# Patient Record
Sex: FEMALE | Race: WHITE | NOT HISPANIC OR LATINO | Employment: OTHER | URBAN - NONMETROPOLITAN AREA
[De-identification: names, ages, dates, MRNs, and addresses within clinical notes are randomized per-mention and may not be internally consistent; named-entity substitution may affect disease eponyms.]

---

## 2017-01-07 ENCOUNTER — HOSPITAL ENCOUNTER (OUTPATIENT)
Dept: URGENT CARE | Facility: CLINIC | Age: 82
Discharge: HOME | End: 2017-01-07
Attending: NURSE PRACTITIONER | Admitting: NURSE PRACTITIONER

## 2017-01-07 ENCOUNTER — HOSPITAL ENCOUNTER (OUTPATIENT)
Dept: OTHER | Facility: HOSPITAL | Age: 82
Discharge: HOME | End: 2017-01-07
Attending: NURSE PRACTITIONER | Admitting: NURSE PRACTITIONER

## 2017-04-19 DIAGNOSIS — R30.0 DYSURIA: Primary | ICD-10-CM

## 2017-05-30 RX ORDER — ATORVASTATIN CALCIUM 20 MG/1
TABLET, FILM COATED ORAL
Qty: 15 TABLET | Refills: 0 | Status: SHIPPED | OUTPATIENT
Start: 2017-05-30 | End: 2017-08-15 | Stop reason: SDUPTHER

## 2017-08-08 DIAGNOSIS — E78.5 HYPERLIPIDEMIA, UNSPECIFIED HYPERLIPIDEMIA TYPE: Primary | ICD-10-CM

## 2017-08-08 DIAGNOSIS — I10 ESSENTIAL HYPERTENSION: ICD-10-CM

## 2017-08-08 DIAGNOSIS — M81.0 OSTEOPOROSIS: ICD-10-CM

## 2017-08-08 DIAGNOSIS — R73.01 IMPAIRED FASTING BLOOD SUGAR: ICD-10-CM

## 2017-08-14 RX ORDER — LOSARTAN POTASSIUM AND HYDROCHLOROTHIAZIDE 12.5; 5 MG/1; MG/1
TABLET ORAL
Qty: 30 TABLET | Refills: 11 | Status: CANCELLED | OUTPATIENT
Start: 2017-08-14

## 2017-08-15 ENCOUNTER — OFFICE VISIT (OUTPATIENT)
Dept: FAMILY MEDICINE CLINIC | Facility: CLINIC | Age: 82
End: 2017-08-15

## 2017-08-15 ENCOUNTER — LAB (OUTPATIENT)
Dept: LAB | Facility: OTHER | Age: 82
End: 2017-08-15

## 2017-08-15 VITALS
DIASTOLIC BLOOD PRESSURE: 60 MMHG | BODY MASS INDEX: 27.72 KG/M2 | SYSTOLIC BLOOD PRESSURE: 118 MMHG | HEART RATE: 64 BPM | HEIGHT: 67 IN | WEIGHT: 176.6 LBS | TEMPERATURE: 97.8 F

## 2017-08-15 DIAGNOSIS — I10 ESSENTIAL HYPERTENSION: ICD-10-CM

## 2017-08-15 DIAGNOSIS — E55.9 VITAMIN D DEFICIENCY: Chronic | ICD-10-CM

## 2017-08-15 DIAGNOSIS — M81.0 OSTEOPOROSIS: ICD-10-CM

## 2017-08-15 DIAGNOSIS — F32.A DEPRESSIVE DISORDER: Chronic | ICD-10-CM

## 2017-08-15 DIAGNOSIS — R73.01 IMPAIRED FASTING BLOOD SUGAR: ICD-10-CM

## 2017-08-15 DIAGNOSIS — G30.1 LATE ONSET ALZHEIMER'S DISEASE WITHOUT BEHAVIORAL DISTURBANCE (HCC): Chronic | ICD-10-CM

## 2017-08-15 DIAGNOSIS — R73.01 IMPAIRED FASTING GLUCOSE: Chronic | ICD-10-CM

## 2017-08-15 DIAGNOSIS — K59.04 CHRONIC IDIOPATHIC CONSTIPATION: Chronic | ICD-10-CM

## 2017-08-15 DIAGNOSIS — E78.2 MIXED HYPERLIPIDEMIA: Chronic | ICD-10-CM

## 2017-08-15 DIAGNOSIS — F02.80 LATE ONSET ALZHEIMER'S DISEASE WITHOUT BEHAVIORAL DISTURBANCE (HCC): Chronic | ICD-10-CM

## 2017-08-15 DIAGNOSIS — I10 ESSENTIAL HYPERTENSION: Primary | Chronic | ICD-10-CM

## 2017-08-15 DIAGNOSIS — E78.5 HYPERLIPIDEMIA, UNSPECIFIED HYPERLIPIDEMIA TYPE: ICD-10-CM

## 2017-08-15 DIAGNOSIS — M17.0 PRIMARY OSTEOARTHRITIS OF BOTH KNEES: Chronic | ICD-10-CM

## 2017-08-15 LAB
ALBUMIN SERPL-MCNC: 4 G/DL (ref 3.2–5.5)
ALBUMIN/GLOB SERPL: 1.3 G/DL (ref 1–3)
ALP SERPL-CCNC: 78 U/L (ref 15–121)
ALT SERPL W P-5'-P-CCNC: 12 U/L (ref 10–60)
ANION GAP SERPL CALCULATED.3IONS-SCNC: 11 MMOL/L (ref 5–15)
AST SERPL-CCNC: 22 U/L (ref 10–60)
BACTERIA UR QL AUTO: ABNORMAL /HPF
BASOPHILS # BLD AUTO: 0.04 10*3/MM3 (ref 0–0.2)
BASOPHILS NFR BLD AUTO: 0.7 % (ref 0–2)
BILIRUB SERPL-MCNC: 0.9 MG/DL (ref 0.2–1)
BILIRUB UR QL STRIP: NEGATIVE
BUN BLD-MCNC: 43 MG/DL (ref 8–25)
BUN/CREAT SERPL: 30.7 (ref 7–25)
CALCIUM SPEC-SCNC: 9.9 MG/DL (ref 8.4–10.8)
CHLORIDE SERPL-SCNC: 103 MMOL/L (ref 100–112)
CHOLEST SERPL-MCNC: 252 MG/DL (ref 150–200)
CLARITY UR: CLEAR
CO2 SERPL-SCNC: 27 MMOL/L (ref 20–32)
COLOR UR: YELLOW
CREAT BLD-MCNC: 1.4 MG/DL (ref 0.4–1.3)
DEPRECATED RDW RBC AUTO: 45.7 FL (ref 36.4–46.3)
EOSINOPHIL # BLD AUTO: 0.19 10*3/MM3 (ref 0–0.7)
EOSINOPHIL NFR BLD AUTO: 3.3 % (ref 0–7)
ERYTHROCYTE [DISTWIDTH] IN BLOOD BY AUTOMATED COUNT: 14.9 % (ref 11.5–14.5)
GFR SERPL CREATININE-BSD FRML MDRD: 35 ML/MIN/1.73 (ref 39–90)
GLOBULIN UR ELPH-MCNC: 3.2 GM/DL (ref 2.5–4.6)
GLUCOSE BLD-MCNC: 119 MG/DL (ref 70–100)
GLUCOSE UR STRIP-MCNC: NEGATIVE MG/DL
HCT VFR BLD AUTO: 36.7 % (ref 35–45)
HDLC SERPL-MCNC: 59 MG/DL (ref 35–100)
HGB BLD-MCNC: 11.8 G/DL (ref 12–15.5)
HGB UR QL STRIP.AUTO: NEGATIVE
HYALINE CASTS UR QL AUTO: ABNORMAL /LPF
KETONES UR QL STRIP: ABNORMAL
LDLC SERPL CALC-MCNC: 165 MG/DL
LDLC/HDLC SERPL: 2.8 {RATIO}
LEUKOCYTE ESTERASE UR QL STRIP.AUTO: ABNORMAL
LYMPHOCYTES # BLD AUTO: 1.81 10*3/MM3 (ref 0.6–4.2)
LYMPHOCYTES NFR BLD AUTO: 31.7 % (ref 10–50)
MCH RBC QN AUTO: 27.3 PG (ref 26.5–34)
MCHC RBC AUTO-ENTMCNC: 32.2 G/DL (ref 31.4–36)
MCV RBC AUTO: 84.8 FL (ref 80–98)
MONOCYTES # BLD AUTO: 0.57 10*3/MM3 (ref 0–0.9)
MONOCYTES NFR BLD AUTO: 10 % (ref 0–12)
MUCOUS THREADS URNS QL MICRO: ABNORMAL /HPF
NEUTROPHILS # BLD AUTO: 3.1 10*3/MM3 (ref 2–8.6)
NEUTROPHILS NFR BLD AUTO: 54.3 % (ref 37–80)
NITRITE UR QL STRIP: NEGATIVE
PH UR STRIP.AUTO: 5.5 [PH] (ref 5.5–8)
PLATELET # BLD AUTO: 287 10*3/MM3 (ref 150–450)
PMV BLD AUTO: 9.9 FL (ref 8–12)
POTASSIUM BLD-SCNC: 5.1 MMOL/L (ref 3.4–5.4)
PROT SERPL-MCNC: 7.2 G/DL (ref 6.7–8.2)
PROT UR QL STRIP: NEGATIVE
RBC # BLD AUTO: 4.33 10*6/MM3 (ref 3.77–5.16)
RBC # UR: ABNORMAL /HPF
REF LAB TEST METHOD: ABNORMAL
SODIUM BLD-SCNC: 141 MMOL/L (ref 134–146)
SP GR UR STRIP: 1.02 (ref 1–1.03)
SQUAMOUS #/AREA URNS HPF: ABNORMAL /HPF
TRIGL SERPL-MCNC: 138 MG/DL (ref 35–160)
UROBILINOGEN UR QL STRIP: ABNORMAL
VLDLC SERPL-MCNC: 27.6 MG/DL
WBC CASTS #/AREA URNS LPF: ABNORMAL /LPF
WBC CLUMPS # UR AUTO: ABNORMAL /HPF
WBC NRBC COR # BLD: 5.71 10*3/MM3 (ref 3.2–9.8)
WBC UR QL AUTO: ABNORMAL /HPF
YEAST URNS QL MICRO: ABNORMAL /HPF

## 2017-08-15 PROCEDURE — 99214 OFFICE O/P EST MOD 30 MIN: CPT | Performed by: INTERNAL MEDICINE

## 2017-08-15 PROCEDURE — 85025 COMPLETE CBC W/AUTO DIFF WBC: CPT | Performed by: INTERNAL MEDICINE

## 2017-08-15 PROCEDURE — 81001 URINALYSIS AUTO W/SCOPE: CPT | Performed by: INTERNAL MEDICINE

## 2017-08-15 PROCEDURE — 83036 HEMOGLOBIN GLYCOSYLATED A1C: CPT | Performed by: INTERNAL MEDICINE

## 2017-08-15 PROCEDURE — 36415 COLL VENOUS BLD VENIPUNCTURE: CPT | Performed by: INTERNAL MEDICINE

## 2017-08-15 PROCEDURE — 80053 COMPREHEN METABOLIC PANEL: CPT | Performed by: INTERNAL MEDICINE

## 2017-08-15 PROCEDURE — 82306 VITAMIN D 25 HYDROXY: CPT | Performed by: INTERNAL MEDICINE

## 2017-08-15 PROCEDURE — 80061 LIPID PANEL: CPT | Performed by: INTERNAL MEDICINE

## 2017-08-15 PROCEDURE — 87086 URINE CULTURE/COLONY COUNT: CPT | Performed by: INTERNAL MEDICINE

## 2017-08-15 RX ORDER — ESCITALOPRAM OXALATE 10 MG/1
TABLET ORAL
Qty: 30 TABLET | Refills: 11 | Status: SHIPPED | OUTPATIENT
Start: 2017-08-15 | End: 2017-10-16 | Stop reason: SDUPTHER

## 2017-08-15 RX ORDER — DONEPEZIL HYDROCHLORIDE 5 MG/1
5 TABLET, FILM COATED ORAL NIGHTLY
Qty: 30 TABLET | Refills: 11 | Status: SHIPPED | OUTPATIENT
Start: 2017-08-15 | End: 2018-06-05 | Stop reason: SDUPTHER

## 2017-08-15 RX ORDER — ATORVASTATIN CALCIUM 20 MG/1
TABLET, FILM COATED ORAL
Qty: 15 TABLET | Refills: 11 | Status: SHIPPED | OUTPATIENT
Start: 2017-08-15 | End: 2018-06-05 | Stop reason: SDUPTHER

## 2017-08-15 RX ORDER — LOSARTAN POTASSIUM AND HYDROCHLOROTHIAZIDE 12.5; 5 MG/1; MG/1
TABLET ORAL
Qty: 30 TABLET | Refills: 11 | Status: SHIPPED | OUTPATIENT
Start: 2017-08-15 | End: 2018-06-05 | Stop reason: SDUPTHER

## 2017-08-15 RX ORDER — NAPROXEN 375 MG/1
375 TABLET ORAL 2 TIMES DAILY WITH MEALS
Qty: 60 TABLET | Refills: 1 | Status: SHIPPED | OUTPATIENT
Start: 2017-08-15 | End: 2017-10-20 | Stop reason: SDUPTHER

## 2017-08-15 NOTE — PROGRESS NOTES
Subjective     Camilla Izquierdo is a 89 y.o. female.     History of Present Illness   Marychuy is here for overdue follow-up of multiple medical issues including hyperlipidemia, peripheral vascular disease, carotid artery disease, impaired fasting glucose, osteoporosis, Alzheimer's disease, and multiple other issues.    She has been the primary caregiver for her son, Chuck, for all of his adult life.  He has Down syndrome.  He is now living in the [facility].  A sitter stays with Marychuy at night, and family members stay with her during the day.  There has been no falls.  She seems to be doing well physically, but her memory has obviously declined.  Last year, her MMSE was 28/30.  Today her MMSE is 17/30.  We prescribed Aricept previously, but the patient decided not to take it after she read the side effects.  It was inadvertently listed as a drug allergy, but her sitter/caregiver reports she never had difficulty taking it because she never started it.  I recommend starting the Aricept today.    She is taking milk of magnesia approximate 3 times weekly to address constipation.  She gets a reasonable amount of fruits and vesicles in her diet, but has not tried using a stool softener.    They report episodic arthritic pains of the bilateral knees.  This is being relieved by use of Tylenol per label direction.  Occasionally they have used prescription strength naproxen sodium to provide additional relief.  They're requesting a refill on the naproxen sodium today.    She obtained her fasting blood work this morning.  Total cholesterol is higher at 252.  Creatinine is stable at 1.4.  CBC is stable with hemoglobin 11.9.  Fasting glucose is 119.    Review of Systems   Constitutional: Negative for appetite change, chills, fatigue, fever and unexpected weight change.   HENT: Negative for congestion, ear pain, postnasal drip, sinus pressure and sore throat.    Respiratory: Negative for cough, shortness of breath and  "wheezing.    Cardiovascular: Negative for chest pain, palpitations and leg swelling.   Gastrointestinal: Positive for constipation. Negative for abdominal pain, blood in stool, diarrhea, nausea and vomiting.   Endocrine: Negative for cold intolerance, heat intolerance, polydipsia and polyuria.   Genitourinary: Negative for dysuria, frequency, hematuria and urgency.   Musculoskeletal: Positive for gait problem.   Skin: Negative for rash.   Neurological: Negative for syncope, weakness and headaches.   Psychiatric/Behavioral: Positive for confusion and decreased concentration. Negative for agitation, behavioral problems, dysphoric mood and hallucinations.       Objective     /60  Pulse 64  Temp 97.8 °F (36.6 °C) (Oral)   Ht 67\" (170.2 cm)  Wt 176 lb 9.6 oz (80.1 kg)  BMI 27.66 kg/m2    Physical Exam   Constitutional: She is oriented to person, place, and time. She appears well-developed and well-nourished. No distress.   Pleasantly demented.  Accompanied by her caregiver   HENT:   Head: Normocephalic and atraumatic.   Nose: Right sinus exhibits no maxillary sinus tenderness and no frontal sinus tenderness. Left sinus exhibits no maxillary sinus tenderness and no frontal sinus tenderness.   Mouth/Throat: Uvula is midline, oropharynx is clear and moist and mucous membranes are normal. No oral lesions. No tonsillar exudate.   Eyes: Conjunctivae and EOM are normal. Pupils are equal, round, and reactive to light.   Neck: Trachea normal. Neck supple. No JVD present. Carotid bruit is not present. No tracheal deviation present. No thyroid mass and no thyromegaly present.   Cardiovascular: Normal rate, regular rhythm and normal heart sounds.   No extrasystoles are present. PMI is not displaced.    No murmur heard.  Pulmonary/Chest: Effort normal and breath sounds normal. No accessory muscle usage. No respiratory distress. She has no decreased breath sounds. She has no wheezes. She has no rhonchi. She has no rales. "   Abdominal: Soft. Bowel sounds are normal. She exhibits no distension. There is no hepatosplenomegaly. There is no tenderness.   Musculoskeletal:   Arthritic changes of knees and hands       Vascular Status -  Her exam exhibits right foot vasculature normal. Her exam exhibits no right foot edema. Her exam exhibits left foot vasculature normal. Her exam exhibits no left foot edema.  Lymphadenopathy:     She has no cervical adenopathy.   Neurological: She is alert and oriented to person, place, and time. No cranial nerve deficit. Coordination normal.   Skin: Skin is warm, dry and intact. No rash noted. No cyanosis. Nails show no clubbing.   Psychiatric: She has a normal mood and affect. Her speech is normal and behavior is normal.   Vitals reviewed.      PHQ-9 Depression Screening 8/15/2017   Little interest or pleasure in doing things 1   Feeling down, depressed, or hopeless 1   Trouble falling or staying asleep, or sleeping too much 0   Feeling tired or having little energy 1   Poor appetite or overeating 0   Feeling bad about yourself - or that you are a failure or have let yourself or your family down 0   Trouble concentrating on things, such as reading the newspaper or watching television 1   Moving or speaking so slowly that other people could have noticed. Or the opposite - being so fidgety or restless that you have been moving around a lot more than usual 0   Thoughts that you would be better off dead, or of hurting yourself in some way 0   PHQ-9 Total Score 4   If you checked off any problems, how difficult have these problems made it for you to do your work, take care of things at home, or get along with other people? Not difficult at all       Assessment/Plan   I have removed Aricept from her allergy list, as this was an error.  We will start Aricept 5 mg daily at bedtime and plan on increasing that in a couple months.  We will want to add Namenda in the near future as well.  The goal is to use these  medications to enable the patient to remain in the home environment for as long as possible.    I urged compliance with daily Lipitor.  The worsened lipid profile please do believe she has been rather noncompliant with this.    For the arthritic knees, she needed to use Tylenol per label directions.  She may occasionally use naproxen sodium 375 mg daily on the worst days.    Continue Lexapro 10 mg daily.    Continue the calcium supplement and vitamin D supplement.    When she returns, I want to discuss repeat carotid Dopplers and bone density.    Return to clinic in 2 months to reassess the dementia and these other issues.  We will then need to schedule her next follow-up appointment in labs.  I would prefer to see her every 6 months in the future.  Diagnoses and all orders for this visit:    Essential hypertension    Primary osteoarthritis of both knees    Mixed hyperlipidemia    Vitamin D deficiency    Chronic idiopathic constipation    Depressive disorder    Late onset Alzheimer's disease without behavioral disturbance - restarted Aricept, 8/2017    Other orders  -     atorvastatin (LIPITOR) 20 MG tablet; 1/2 tab daily  -     escitalopram (LEXAPRO) 10 MG tablet; Take one tab daily  -     losartan-hydrochlorothiazide (HYZAAR) 50-12.5 MG per tablet; Take one tab daily  -     naproxen (NAPROSYN) 375 MG tablet; Take 1 tablet by mouth 2 (Two) Times a Day With Meals.  -     donepezil (ARICEPT) 5 MG tablet; Take 1 tablet by mouth Every Night. For memory        No visits with results within 3 Week(s) from this visit.  Latest known visit with results is:    Orders Only on 04/19/2017   Component Date Value Ref Range Status   • Color,  08/15/2017 Yellow  Yellow, Straw Final   • Appearance,  08/15/2017 Clear  Clear Final   • pH,  08/15/2017 5.5  5.5 - 8.0 Final   • Specific Gravity,  08/15/2017 1.020  1.005 - 1.030 Final   • Glucose,  08/15/2017 Negative  Negative Final   • Ketones,  08/15/2017 Trace* Negative  Final   • Bilirubin, UA 08/15/2017 Negative  Negative Final   • Blood, UA 08/15/2017 Negative  Negative Final   • Protein, UA 08/15/2017 Negative  Negative Final   • Leuk Esterase, UA 08/15/2017 Moderate (2+)* Negative Final   • Nitrite, UA 08/15/2017 Negative  Negative Final   • Urobilinogen, UA 08/15/2017 0.2 E.U./dL  0.2 - 1.0 E.U./dL Final   • RBC, UA 08/15/2017 3-5* None Seen /HPF Final   • WBC, UA 08/15/2017 6-12* None Seen /HPF Final   • Bacteria, UA 08/15/2017 1+* None Seen /HPF Final   • Squamous Epithelial Cells, UA 08/15/2017 7-12* None Seen, 0-2 /HPF Final   • Yeast, UA 08/15/2017 Small/1+ Yeast  None Seen /HPF Final   • Hyaline Casts, UA 08/15/2017 None Seen  None Seen /LPF Final   • WBC Casts 08/15/2017 0-2  None Seen /LPF Final   • Mucus, UA 08/15/2017 Trace  None Seen, Trace /HPF Final   • WBC Clumps, UA 08/15/2017 Small/1+  None Seen /HPF Final   • Methodology 08/15/2017 Manual Light Microscopy   Final   ]

## 2017-08-16 ENCOUNTER — TELEPHONE (OUTPATIENT)
Dept: FAMILY MEDICINE CLINIC | Facility: CLINIC | Age: 82
End: 2017-08-16

## 2017-08-16 LAB
25(OH)D3 SERPL-MCNC: 33.3 NG/ML (ref 30–100)
HBA1C MFR BLD: 6.7 % (ref 4–5.6)

## 2017-08-16 RX ORDER — CEFUROXIME AXETIL 250 MG/1
250 TABLET ORAL 2 TIMES DAILY
Qty: 10 TABLET | Refills: 0 | Status: SHIPPED | OUTPATIENT
Start: 2017-08-16 | End: 2017-08-21

## 2017-08-16 NOTE — TELEPHONE ENCOUNTER
----- Message from Eric Barton MD sent at 8/15/2017  4:44 PM CDT -----  Ceftin 250 is  mg twice a day ×5 days.      08/16/2017 relayed results to patient and will be notifying family. Sent order to the pharmacy and will ask for delivery to her home. TP

## 2017-08-17 LAB — BACTERIA SPEC AEROBE CULT: NORMAL

## 2017-10-16 RX ORDER — ESCITALOPRAM OXALATE 10 MG/1
TABLET ORAL
Qty: 30 TABLET | Refills: 11 | Status: SHIPPED | OUTPATIENT
Start: 2017-10-16 | End: 2018-06-05 | Stop reason: SDUPTHER

## 2017-10-20 RX ORDER — NAPROXEN 375 MG/1
TABLET ORAL
Qty: 60 TABLET | Refills: 1 | Status: SHIPPED | OUTPATIENT
Start: 2017-10-20 | End: 2018-10-16

## 2018-06-05 ENCOUNTER — OFFICE VISIT (OUTPATIENT)
Dept: FAMILY MEDICINE CLINIC | Facility: CLINIC | Age: 83
End: 2018-06-05

## 2018-06-05 VITALS
TEMPERATURE: 97.2 F | DIASTOLIC BLOOD PRESSURE: 58 MMHG | HEIGHT: 67 IN | HEART RATE: 63 BPM | SYSTOLIC BLOOD PRESSURE: 122 MMHG | BODY MASS INDEX: 27.62 KG/M2 | WEIGHT: 176 LBS | OXYGEN SATURATION: 98 %

## 2018-06-05 DIAGNOSIS — F02.818 LATE ONSET ALZHEIMER'S DISEASE WITH BEHAVIORAL DISTURBANCE (HCC): Primary | Chronic | ICD-10-CM

## 2018-06-05 DIAGNOSIS — I10 ESSENTIAL HYPERTENSION: Chronic | ICD-10-CM

## 2018-06-05 DIAGNOSIS — F32.A DEPRESSIVE DISORDER: Chronic | ICD-10-CM

## 2018-06-05 DIAGNOSIS — F05 SUNDOWN SYNDROME: Chronic | ICD-10-CM

## 2018-06-05 DIAGNOSIS — G47.9 SLEEP DISORDER: Chronic | ICD-10-CM

## 2018-06-05 DIAGNOSIS — L82.1 SEBORRHEIC KERATOSIS: ICD-10-CM

## 2018-06-05 DIAGNOSIS — G30.1 LATE ONSET ALZHEIMER'S DISEASE WITH BEHAVIORAL DISTURBANCE (HCC): Primary | Chronic | ICD-10-CM

## 2018-06-05 PROCEDURE — 99214 OFFICE O/P EST MOD 30 MIN: CPT | Performed by: INTERNAL MEDICINE

## 2018-06-05 RX ORDER — QUETIAPINE FUMARATE 25 MG/1
TABLET, FILM COATED ORAL
Qty: 90 TABLET | Refills: 11 | Status: SHIPPED | OUTPATIENT
Start: 2018-06-05 | End: 2018-08-29 | Stop reason: SDUPTHER

## 2018-06-05 RX ORDER — LOSARTAN POTASSIUM AND HYDROCHLOROTHIAZIDE 12.5; 5 MG/1; MG/1
TABLET ORAL
Qty: 30 TABLET | Refills: 11 | Status: SHIPPED | OUTPATIENT
Start: 2018-06-05 | End: 2019-04-22

## 2018-06-05 RX ORDER — ESCITALOPRAM OXALATE 10 MG/1
TABLET ORAL
Qty: 30 TABLET | Refills: 11 | Status: SHIPPED | OUTPATIENT
Start: 2018-06-05 | End: 2019-06-17 | Stop reason: SDUPTHER

## 2018-06-05 RX ORDER — ATORVASTATIN CALCIUM 20 MG/1
TABLET, FILM COATED ORAL
Qty: 15 TABLET | Refills: 11 | Status: SHIPPED | OUTPATIENT
Start: 2018-06-05 | End: 2019-06-28 | Stop reason: SDUPTHER

## 2018-06-05 RX ORDER — DONEPEZIL HYDROCHLORIDE 5 MG/1
5 TABLET, FILM COATED ORAL NIGHTLY
Qty: 30 TABLET | Refills: 11 | Status: SHIPPED | OUTPATIENT
Start: 2018-06-05 | End: 2019-06-13 | Stop reason: SDUPTHER

## 2018-06-05 NOTE — PROGRESS NOTES
Subjective     History of Present Illness     Camilla Izquierdo is a 89 y.o. female with multiple medical issues including hyperlipidemia, peripheral vascular disease, carotid artery disease, impaired fasting glucose, osteoporosis, Alzheimer's disease, and multiple other issues.  She continues on Aricept for Alzheimer's dementia. She was the  primary caregiver for her son with Down's Syndrome, Chuck, for all of his adult life and he passed away earlier this year.  Her MMSE was 17/30 with her last visit.  We had prescribed Aricept previously, but the patient decided not to take it after she read the side effects and is was inadvertently listed as a drug allergy, but her sitter/caregiver reported she never had difficulty taking it because she never started it.  I recommend restarting the Aricept with her last visit and they agreed.     Her blood pressure is well controlled today with losartan HCT.  Her body weight is stable.    Depression symptoms seem to be adequately controlled with the current dose of Lexapro.    Her daughter wanted to speak with me prior to her mother's visit today.  She reports daily sundowning symptoms occurring about 4-5 o'clock each evening as well as paranoia and agitation.  She is also sleeping poorly.  She wants to roam at night.  I discussed treatment options and recommended starting  Seroquel  with a goal of managing symptoms without excessive daytime sedation.         She has a keratonized benign skin lesion bridge of nose.  She also has a benign seborrheic keratosis noted on right side of face.        Review of Systems   Constitutional: Negative for chills, fatigue and fever.   HENT: Negative for congestion, ear pain, postnasal drip, sinus pressure and sore throat.    Respiratory: Negative for cough, shortness of breath and wheezing.    Cardiovascular: Negative for chest pain, palpitations and leg swelling.   Gastrointestinal: Negative for abdominal pain, blood in stool, constipation,  "diarrhea, nausea and vomiting.   Endocrine: Negative for cold intolerance, heat intolerance, polydipsia and polyuria.   Genitourinary: Negative for dysuria, frequency, hematuria and urgency.   Skin: Negative for rash.   Neurological: Negative for syncope and weakness.   Psychiatric/Behavioral: Positive for confusion and sleep disturbance.        Objective     Vitals:    06/05/18 1045   BP: 122/58   Pulse: 63   Temp: 97.2 °F (36.2 °C)   TempSrc: Oral   SpO2: 98%   Weight: 79.8 kg (176 lb)   Height: 170.2 cm (67\")     Physical Exam   Constitutional: She is oriented to person, place, and time. She appears well-developed and well-nourished. No distress.   Pleasantly demented.  Accompanied by her caregiver    HENT:   Head: Normocephalic and atraumatic.   Nose: Right sinus exhibits no maxillary sinus tenderness and no frontal sinus tenderness. Left sinus exhibits no maxillary sinus tenderness and no frontal sinus tenderness.   Mouth/Throat: Uvula is midline, oropharynx is clear and moist and mucous membranes are normal. No oral lesions. No tonsillar exudate.   Eyes: Conjunctivae and EOM are normal. Pupils are equal, round, and reactive to light.   Neck: Trachea normal. Neck supple. No JVD present. Carotid bruit is not present. No tracheal deviation present. No thyroid mass and no thyromegaly present.   Cardiovascular: Normal rate, regular rhythm, normal heart sounds and intact distal pulses.   No extrasystoles are present. PMI is not displaced.    No murmur heard.  Pulmonary/Chest: Effort normal and breath sounds normal. No accessory muscle usage. No respiratory distress. She has no decreased breath sounds. She has no wheezes. She has no rhonchi. She has no rales.   Abdominal: Soft. Bowel sounds are normal. She exhibits no distension. There is no hepatosplenomegaly. There is no tenderness.   Musculoskeletal:   Arthritic changes of knees and hands      Vascular Status -  Her right foot exhibits normal foot vasculature  and " no edema. Her left foot exhibits normal foot vasculature  and no edema.  Lymphadenopathy:     She has no cervical adenopathy.   Neurological: She is alert and oriented to person, place, and time. No cranial nerve deficit. Coordination normal.   Skin: Skin is warm, dry and intact. No rash noted. No cyanosis. Nails show no clubbing.   Keratonized benign skin lesion bridge of nose and benign-appearing seborrheic keratosis noted on right side of face   Psychiatric: She has a normal mood and affect. Her speech is normal and behavior is normal. Judgment and thought content normal.   Vitals reviewed.      Assessment/Plan      A prescription is sent for Seroquel to take one in the afternoon and 1-2 prior to bedtime.  The goal is to manage symptoms without excessive daytime sedation.  I have asked the family to update us approximately once a week until they feel symptoms are adequately managed.  We discussed ways to deal with the patient's current behavior.    Continue the losartan HCT.    Continue the Aricept 5 mg daily.  I will increase it in the near future and then we may try adding Namenda.      Schedule appointment in a couple of weeks for cryotherapy removal of the keratonized benign skin lesion on the bridge of nose and also benign seborrheic keratosis noted on right side of face.       Scribed for Dr. Barton by Yulia Barros [facility].     Diagnoses and all orders for this visit:    Late onset Alzheimer's disease with behavioral disturbance    Essential hypertension    Depressive disorder    Sleep disorder    Other orders  -     losartan-hydrochlorothiazide (HYZAAR) 50-12.5 MG per tablet; Take one tab daily  -     atorvastatin (LIPITOR) 20 MG tablet; 1/2 tab daily  -     escitalopram (LEXAPRO) 10 MG tablet; Take one tab daily  -     donepezil (ARICEPT) 5 MG tablet; Take 1 tablet by mouth Every Night. For memory  -     QUEtiapine (SEROquel) 25 MG tablet; 1 tablet mid-afternoon and 1-2 tablets before bedtime        No  visits with results within 3 Week(s) from this visit.   Latest known visit with results is:   Lab on 08/15/2017   Component Date Value Ref Range Status   • Glucose 08/15/2017 119* 70 - 100 mg/dL Final   • BUN 08/15/2017 43* 8 - 25 mg/dL Final   • Creatinine 08/15/2017 1.40* 0.40 - 1.30 mg/dL Final   • Sodium 08/15/2017 141  134 - 146 mmol/L Final   • Potassium 08/15/2017 5.1  3.4 - 5.4 mmol/L Final   • Chloride 08/15/2017 103  100 - 112 mmol/L Final   • CO2 08/15/2017 27.0  20.0 - 32.0 mmol/L Final   • Calcium 08/15/2017 9.9  8.4 - 10.8 mg/dL Final   • Total Protein 08/15/2017 7.2  6.7 - 8.2 g/dL Final   • Albumin 08/15/2017 4.00  3.20 - 5.50 g/dL Final   • ALT (SGPT) 08/15/2017 12  10 - 60 U/L Final   • AST (SGOT) 08/15/2017 22  10 - 60 U/L Final   • Alkaline Phosphatase 08/15/2017 78  15 - 121 U/L Final   • Total Bilirubin 08/15/2017 0.9  0.2 - 1.0 mg/dL Final   • eGFR Non African Amer 08/15/2017 35* 39 - 90 mL/min/1.73 Final   • Globulin 08/15/2017 3.2  2.5 - 4.6 gm/dL Final   • A/G Ratio 08/15/2017 1.3  1.0 - 3.0 g/dL Final   • BUN/Creatinine Ratio 08/15/2017 30.7* 7.0 - 25.0 Final   • Anion Gap 08/15/2017 11.0  5.0 - 15.0 mmol/L Final   • Hemoglobin A1C 08/15/2017 6.7* 4 - 5.6 % Final   • 25 Hydroxy, Vitamin D 08/15/2017 33.3  30.0 - 100.0 ng/ml Final   • Total Cholesterol 08/15/2017 252* 150 - 200 mg/dL Final   • Triglycerides 08/15/2017 138  35 - 160 mg/dL Final   • HDL Cholesterol 08/15/2017 59  35 - 100 mg/dL Final   • LDL Cholesterol  08/15/2017 165  mg/dL Final   • VLDL Cholesterol 08/15/2017 27.6  mg/dL Final   • LDL/HDL Ratio 08/15/2017 2.80   Final   • WBC 08/15/2017 5.71  3.20 - 9.80 10*3/mm3 Final   • RBC 08/15/2017 4.33  3.77 - 5.16 10*6/mm3 Final   • Hemoglobin 08/15/2017 11.8* 12.0 - 15.5 g/dL Final   • Hematocrit 08/15/2017 36.7  35.0 - 45.0 % Final   • MCV 08/15/2017 84.8  80.0 - 98.0 fL Final   • MCH 08/15/2017 27.3  26.5 - 34.0 pg Final   • MCHC 08/15/2017 32.2  31.4 - 36.0 g/dL Final   • RDW  08/15/2017 14.9* 11.5 - 14.5 % Final   • RDW-SD 08/15/2017 45.7  36.4 - 46.3 fl Final   • MPV 08/15/2017 9.9  8.0 - 12.0 fL Final   • Platelets 08/15/2017 287  150 - 450 10*3/mm3 Final   • Neutrophil % 08/15/2017 54.3  37.0 - 80.0 % Final   • Lymphocyte % 08/15/2017 31.7  10.0 - 50.0 % Final   • Monocyte % 08/15/2017 10.0  0.0 - 12.0 % Final   • Eosinophil % 08/15/2017 3.3  0.0 - 7.0 % Final   • Basophil % 08/15/2017 0.7  0.0 - 2.0 % Final   • Neutrophils, Absolute 08/15/2017 3.10  2.00 - 8.60 10*3/mm3 Final   • Lymphocytes, Absolute 08/15/2017 1.81  0.60 - 4.20 10*3/mm3 Final   • Monocytes, Absolute 08/15/2017 0.57  0.00 - 0.90 10*3/mm3 Final   • Eosinophils, Absolute 08/15/2017 0.19  0.00 - 0.70 10*3/mm3 Final   • Basophils, Absolute 08/15/2017 0.04  0.00 - 0.20 10*3/mm3 Final   ]

## 2018-06-19 ENCOUNTER — OFFICE VISIT (OUTPATIENT)
Dept: FAMILY MEDICINE CLINIC | Facility: CLINIC | Age: 83
End: 2018-06-19

## 2018-06-19 VITALS
HEART RATE: 60 BPM | SYSTOLIC BLOOD PRESSURE: 130 MMHG | HEIGHT: 67 IN | BODY MASS INDEX: 28.25 KG/M2 | WEIGHT: 180 LBS | TEMPERATURE: 97.8 F | DIASTOLIC BLOOD PRESSURE: 70 MMHG

## 2018-06-19 DIAGNOSIS — L98.9 SKIN LESION OF FACE: Primary | ICD-10-CM

## 2018-06-19 DIAGNOSIS — G30.1 LATE ONSET ALZHEIMER'S DISEASE WITH BEHAVIORAL DISTURBANCE (HCC): Chronic | ICD-10-CM

## 2018-06-19 DIAGNOSIS — F02.818 LATE ONSET ALZHEIMER'S DISEASE WITH BEHAVIORAL DISTURBANCE (HCC): Chronic | ICD-10-CM

## 2018-06-19 DIAGNOSIS — F05 SUNDOWN SYNDROME: Chronic | ICD-10-CM

## 2018-06-19 PROCEDURE — 17000 DESTRUCT PREMALG LESION: CPT | Performed by: INTERNAL MEDICINE

## 2018-06-19 PROCEDURE — 99213 OFFICE O/P EST LOW 20 MIN: CPT | Performed by: INTERNAL MEDICINE

## 2018-06-19 NOTE — PROGRESS NOTES
"Subjective        History of Present Illness     Camilla Izquierdo (Mae) is a 89 y.o. female who presents for electrocautery destruction of an irritated, keratonized benign-appearing skin lesion on the bridge of nose.  It has become large enough that it is irritating to her and they have to remind her to stop taking at it.  Options are discussed, and they choose definitive destruction/removal today.  Electrocautery is used to destroy/remove the benign keratonized lesion on the bridge of the nose without difficulty or complications.  She tolerated well.  Wound care instructions given as below.          She continues on Aricept for Alzheimer's dementia.  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  They deny any excessive sedation with the current dose of Seroquel.    Review of Systems   Constitutional: Negative for chills, fatigue and fever.   HENT: Negative for congestion, ear pain, postnasal drip, sinus pressure and sore throat.    Respiratory: Negative for cough, shortness of breath and wheezing.    Cardiovascular: Negative for chest pain, palpitations and leg swelling.   Gastrointestinal: Negative for abdominal pain, blood in stool, constipation, diarrhea, nausea and vomiting.   Endocrine: Negative for cold intolerance, heat intolerance, polydipsia and polyuria.   Genitourinary: Negative for dysuria, frequency, hematuria and urgency.   Skin: Negative for rash.   Neurological: Negative for syncope and weakness.        Objective     Vitals:    06/19/18 1328   BP: 130/70   Pulse: 60   Temp: 97.8 °F (36.6 °C)   TempSrc: Oral   Weight: 81.6 kg (180 lb)   Height: 170.2 cm (67\")       Physical Exam   Constitutional: She is oriented to person, place, and time. She appears well-developed and well-nourished. No distress.   HENT:   Head: Normocephalic and atraumatic.   Nose: Nose normal.   Mouth/Throat: " Oropharynx is clear and moist. No oropharyngeal exudate.   Eyes: EOM are normal. Pupils are equal, round, and reactive to light.   Neck: Neck supple. No JVD present. No thyromegaly present.   Cardiovascular: Normal rate, regular rhythm and normal heart sounds.    Pulmonary/Chest: Effort normal and breath sounds normal. No accessory muscle usage. No respiratory distress. She has no wheezes. She has no rales.   Abdominal: Soft. Bowel sounds are normal. She exhibits no distension. There is no tenderness.   Musculoskeletal: She exhibits no edema.     Vascular Status -  Her right foot exhibits abnormal foot edema (Trace edema bilateral ankles. ). Her left foot exhibits abnormal foot edema.  Lymphadenopathy:     She has no cervical adenopathy.   Neurological: She is alert and oriented to person, place, and time. No cranial nerve deficit.   Skin:   Keratonized benign skin lesion bridge of nose.  Somewhat inflamed/irritated     Psychiatric: She has a normal mood and affect. Her speech is normal.   Obvious dementia.  Not agitated today.  No evidence of excessive sedation.       Assessment/Plan      Destruction of Lesion  Date/Time: 6/19/2018 2:30 PM  Performed by: SAUD ELLER  Authorized by: SAUD ELLER   Preparation: Patient was prepped and draped in the usual sterile fashion.  Local anesthesia used: yes    Anesthesia:  Local anesthesia used: yes  Local Anesthetic: lidocaine 1% without epinephrine  Anesthetic total: 0.75 mL    Sedation:  Patient sedated: no  Patient tolerance: Patient tolerated the procedure well with no immediate complications  Comments: After obtaining verbal consent, bridge of nose is cleaned in sterile technique with alcohol wipe.  1% lidocaine without epinephrine is injected to numb the area.  Electrocautery is used to destroy the benign keratonized lesion on the bridge of the nose without difficulty or complication.  Band-Aid applied.  Wound care instructions are reviewed.  They can clean with  soap and water and apply Neosporin to the area.  Change the Band-aid twice daily.            Continue Aricept and Seroquel.      Return in four months for routine follow up with fasting labs one week prior.     Scribed for Dr. Barton by Yulia Barros [facility].     Diagnoses and all orders for this visit:    Skin lesion of face    Late onset Alzheimer's disease with behavioral disturbance    SunDown syndrome        No visits with results within 3 Week(s) from this visit.   Latest known visit with results is:   Lab on 08/15/2017   Component Date Value Ref Range Status   • Glucose 08/15/2017 119* 70 - 100 mg/dL Final   • BUN 08/15/2017 43* 8 - 25 mg/dL Final   • Creatinine 08/15/2017 1.40* 0.40 - 1.30 mg/dL Final   • Sodium 08/15/2017 141  134 - 146 mmol/L Final   • Potassium 08/15/2017 5.1  3.4 - 5.4 mmol/L Final   • Chloride 08/15/2017 103  100 - 112 mmol/L Final   • CO2 08/15/2017 27.0  20.0 - 32.0 mmol/L Final   • Calcium 08/15/2017 9.9  8.4 - 10.8 mg/dL Final   • Total Protein 08/15/2017 7.2  6.7 - 8.2 g/dL Final   • Albumin 08/15/2017 4.00  3.20 - 5.50 g/dL Final   • ALT (SGPT) 08/15/2017 12  10 - 60 U/L Final   • AST (SGOT) 08/15/2017 22  10 - 60 U/L Final   • Alkaline Phosphatase 08/15/2017 78  15 - 121 U/L Final   • Total Bilirubin 08/15/2017 0.9  0.2 - 1.0 mg/dL Final   • eGFR Non African Amer 08/15/2017 35* 39 - 90 mL/min/1.73 Final   • Globulin 08/15/2017 3.2  2.5 - 4.6 gm/dL Final   • A/G Ratio 08/15/2017 1.3  1.0 - 3.0 g/dL Final   • BUN/Creatinine Ratio 08/15/2017 30.7* 7.0 - 25.0 Final   • Anion Gap 08/15/2017 11.0  5.0 - 15.0 mmol/L Final   • Hemoglobin A1C 08/15/2017 6.7* 4 - 5.6 % Final   • 25 Hydroxy, Vitamin D 08/15/2017 33.3  30.0 - 100.0 ng/ml Final   • Total Cholesterol 08/15/2017 252* 150 - 200 mg/dL Final   • Triglycerides 08/15/2017 138  35 - 160 mg/dL Final   • HDL Cholesterol 08/15/2017 59  35 - 100 mg/dL Final   • LDL Cholesterol  08/15/2017 165  mg/dL Final   • VLDL Cholesterol  08/15/2017 27.6  mg/dL Final   • LDL/HDL Ratio 08/15/2017 2.80   Final   • WBC 08/15/2017 5.71  3.20 - 9.80 10*3/mm3 Final   • RBC 08/15/2017 4.33  3.77 - 5.16 10*6/mm3 Final   • Hemoglobin 08/15/2017 11.8* 12.0 - 15.5 g/dL Final   • Hematocrit 08/15/2017 36.7  35.0 - 45.0 % Final   • MCV 08/15/2017 84.8  80.0 - 98.0 fL Final   • MCH 08/15/2017 27.3  26.5 - 34.0 pg Final   • MCHC 08/15/2017 32.2  31.4 - 36.0 g/dL Final   • RDW 08/15/2017 14.9* 11.5 - 14.5 % Final   • RDW-SD 08/15/2017 45.7  36.4 - 46.3 fl Final   • MPV 08/15/2017 9.9  8.0 - 12.0 fL Final   • Platelets 08/15/2017 287  150 - 450 10*3/mm3 Final   • Neutrophil % 08/15/2017 54.3  37.0 - 80.0 % Final   • Lymphocyte % 08/15/2017 31.7  10.0 - 50.0 % Final   • Monocyte % 08/15/2017 10.0  0.0 - 12.0 % Final   • Eosinophil % 08/15/2017 3.3  0.0 - 7.0 % Final   • Basophil % 08/15/2017 0.7  0.0 - 2.0 % Final   • Neutrophils, Absolute 08/15/2017 3.10  2.00 - 8.60 10*3/mm3 Final   • Lymphocytes, Absolute 08/15/2017 1.81  0.60 - 4.20 10*3/mm3 Final   • Monocytes, Absolute 08/15/2017 0.57  0.00 - 0.90 10*3/mm3 Final   • Eosinophils, Absolute 08/15/2017 0.19  0.00 - 0.70 10*3/mm3 Final   • Basophils, Absolute 08/15/2017 0.04  0.00 - 0.20 10*3/mm3 Final   ]

## 2018-08-29 ENCOUNTER — TELEPHONE (OUTPATIENT)
Dept: FAMILY MEDICINE CLINIC | Facility: CLINIC | Age: 83
End: 2018-08-29

## 2018-08-29 RX ORDER — QUETIAPINE FUMARATE 25 MG/1
TABLET, FILM COATED ORAL
Qty: 150 TABLET | Refills: 11 | Status: SHIPPED | OUTPATIENT
Start: 2018-08-29 | End: 2018-11-19 | Stop reason: SDUPTHER

## 2018-08-29 NOTE — TELEPHONE ENCOUNTER
08/29/2018 relayed instructions to patient family member and called in refills. TP      ----- Message from Eric Barton MD sent at 8/29/2018 11:29 AM CDT -----  Change Seroquel to 25 mg, 1 tablet midday and 3-4 tablets at night.  EB  ----- Message -----  From: Pat Chowdhury RN  Sent: 8/29/2018   8:51 AM  To: Eric Barton MD    Her daughter called regarding Seroquel for her mom. It is ordered 25 mg one at midday and 2 at night. It had been working fine but now she is getting up in the middle of the night roaming. They wanted to know if she needed an increase or different med. Please advise. Pat

## 2018-10-09 ENCOUNTER — LAB (OUTPATIENT)
Dept: LAB | Facility: OTHER | Age: 83
End: 2018-10-09

## 2018-10-09 DIAGNOSIS — I10 ESSENTIAL HYPERTENSION: ICD-10-CM

## 2018-10-09 DIAGNOSIS — R73.01 IMPAIRED FASTING GLUCOSE: ICD-10-CM

## 2018-10-09 DIAGNOSIS — R73.01 IMPAIRED FASTING GLUCOSE: Primary | ICD-10-CM

## 2018-10-09 DIAGNOSIS — E55.9 VITAMIN D DEFICIENCY: ICD-10-CM

## 2018-10-09 DIAGNOSIS — E78.2 MIXED HYPERLIPIDEMIA: ICD-10-CM

## 2018-10-09 LAB
25(OH)D3 SERPL-MCNC: 43.2 NG/ML (ref 30–100)
ALBUMIN SERPL-MCNC: 4.4 G/DL (ref 3.5–5)
ALBUMIN/GLOB SERPL: 1.3 G/DL (ref 1.1–1.8)
ALP SERPL-CCNC: 107 U/L (ref 38–126)
ALT SERPL W P-5'-P-CCNC: 11 U/L
ANION GAP SERPL CALCULATED.3IONS-SCNC: 11 MMOL/L (ref 5–15)
AST SERPL-CCNC: 22 U/L (ref 14–36)
BASOPHILS # BLD AUTO: 0.03 10*3/MM3 (ref 0–0.2)
BASOPHILS NFR BLD AUTO: 0.5 % (ref 0–2)
BILIRUB SERPL-MCNC: 0.6 MG/DL (ref 0.2–1.3)
BUN BLD-MCNC: 36 MG/DL (ref 7–17)
BUN/CREAT SERPL: 21.2 (ref 7–25)
CALCIUM SPEC-SCNC: 10.6 MG/DL (ref 8.4–10.2)
CHLORIDE SERPL-SCNC: 111 MMOL/L (ref 98–107)
CHOLEST SERPL-MCNC: 243 MG/DL (ref 150–200)
CO2 SERPL-SCNC: 22 MMOL/L (ref 22–30)
CREAT BLD-MCNC: 1.7 MG/DL (ref 0.52–1.04)
DEPRECATED RDW RBC AUTO: 44 FL (ref 36.4–46.3)
EOSINOPHIL # BLD AUTO: 0.33 10*3/MM3 (ref 0–0.7)
EOSINOPHIL NFR BLD AUTO: 5.4 % (ref 0–7)
ERYTHROCYTE [DISTWIDTH] IN BLOOD BY AUTOMATED COUNT: 14.4 % (ref 11.5–14.5)
GFR SERPL CREATININE-BSD FRML MDRD: 28 ML/MIN/1.73 (ref 39–90)
GLOBULIN UR ELPH-MCNC: 3.3 GM/DL (ref 2.3–3.5)
GLUCOSE BLD-MCNC: 124 MG/DL (ref 74–99)
HBA1C MFR BLD: 6.6 % (ref 4–5.6)
HCT VFR BLD AUTO: 37 % (ref 35–45)
HDLC SERPL-MCNC: 52 MG/DL (ref 40–59)
HGB BLD-MCNC: 11.8 G/DL (ref 12–15.5)
LDLC SERPL CALC-MCNC: 151 MG/DL
LDLC/HDLC SERPL: 2.9 {RATIO} (ref 0–3.22)
LYMPHOCYTES # BLD AUTO: 1.64 10*3/MM3 (ref 0.6–4.2)
LYMPHOCYTES NFR BLD AUTO: 27 % (ref 10–50)
MCH RBC QN AUTO: 27.1 PG (ref 26.5–34)
MCHC RBC AUTO-ENTMCNC: 31.9 G/DL (ref 31.4–36)
MCV RBC AUTO: 85.1 FL (ref 80–98)
MONOCYTES # BLD AUTO: 0.51 10*3/MM3 (ref 0–0.9)
MONOCYTES NFR BLD AUTO: 8.4 % (ref 0–12)
NEUTROPHILS # BLD AUTO: 3.56 10*3/MM3 (ref 2–8.6)
NEUTROPHILS NFR BLD AUTO: 58.7 % (ref 37–80)
PLATELET # BLD AUTO: 275 10*3/MM3 (ref 150–450)
PMV BLD AUTO: 10 FL (ref 8–12)
POTASSIUM BLD-SCNC: 4.4 MMOL/L (ref 3.4–5)
PROT SERPL-MCNC: 7.7 G/DL (ref 6.3–8.2)
RBC # BLD AUTO: 4.35 10*6/MM3 (ref 3.77–5.16)
SODIUM BLD-SCNC: 144 MMOL/L (ref 137–145)
TRIGL SERPL-MCNC: 200 MG/DL
VLDLC SERPL-MCNC: 40 MG/DL
WBC NRBC COR # BLD: 6.07 10*3/MM3 (ref 3.2–9.8)

## 2018-10-09 PROCEDURE — 82306 VITAMIN D 25 HYDROXY: CPT | Performed by: INTERNAL MEDICINE

## 2018-10-09 PROCEDURE — 83036 HEMOGLOBIN GLYCOSYLATED A1C: CPT | Performed by: INTERNAL MEDICINE

## 2018-10-09 PROCEDURE — 80053 COMPREHEN METABOLIC PANEL: CPT | Performed by: INTERNAL MEDICINE

## 2018-10-09 PROCEDURE — 36415 COLL VENOUS BLD VENIPUNCTURE: CPT | Performed by: INTERNAL MEDICINE

## 2018-10-09 PROCEDURE — 80061 LIPID PANEL: CPT | Performed by: INTERNAL MEDICINE

## 2018-10-09 PROCEDURE — 85025 COMPLETE CBC W/AUTO DIFF WBC: CPT | Performed by: INTERNAL MEDICINE

## 2018-10-16 ENCOUNTER — OFFICE VISIT (OUTPATIENT)
Dept: FAMILY MEDICINE CLINIC | Facility: CLINIC | Age: 83
End: 2018-10-16

## 2018-10-16 VITALS
TEMPERATURE: 97 F | SYSTOLIC BLOOD PRESSURE: 138 MMHG | DIASTOLIC BLOOD PRESSURE: 70 MMHG | HEART RATE: 64 BPM | BODY MASS INDEX: 29.66 KG/M2 | WEIGHT: 189 LBS | HEIGHT: 67 IN

## 2018-10-16 DIAGNOSIS — Z23 NEED FOR PROPHYLACTIC VACCINATION AND INOCULATION AGAINST INFLUENZA: ICD-10-CM

## 2018-10-16 DIAGNOSIS — M81.0 OSTEOPOROSIS WITHOUT CURRENT PATHOLOGICAL FRACTURE, UNSPECIFIED OSTEOPOROSIS TYPE: Chronic | ICD-10-CM

## 2018-10-16 DIAGNOSIS — I73.9 PERIPHERAL VASCULAR DISEASE (HCC): Chronic | ICD-10-CM

## 2018-10-16 DIAGNOSIS — M17.0 PRIMARY OSTEOARTHRITIS OF BOTH KNEES: Chronic | ICD-10-CM

## 2018-10-16 DIAGNOSIS — F05 SUNDOWN SYNDROME: Chronic | ICD-10-CM

## 2018-10-16 DIAGNOSIS — F02.818 LATE ONSET ALZHEIMER'S DISEASE WITH BEHAVIORAL DISTURBANCE (HCC): Chronic | ICD-10-CM

## 2018-10-16 DIAGNOSIS — E55.9 VITAMIN D DEFICIENCY: Chronic | ICD-10-CM

## 2018-10-16 DIAGNOSIS — Z23 NEED FOR PROPHYLACTIC VACCINATION AGAINST STREPTOCOCCUS PNEUMONIAE (PNEUMOCOCCUS): ICD-10-CM

## 2018-10-16 DIAGNOSIS — G30.1 LATE ONSET ALZHEIMER'S DISEASE WITH BEHAVIORAL DISTURBANCE (HCC): Chronic | ICD-10-CM

## 2018-10-16 DIAGNOSIS — R73.01 IMPAIRED FASTING GLUCOSE: Chronic | ICD-10-CM

## 2018-10-16 DIAGNOSIS — F32.A DEPRESSIVE DISORDER: Chronic | ICD-10-CM

## 2018-10-16 DIAGNOSIS — I10 ESSENTIAL HYPERTENSION: Primary | Chronic | ICD-10-CM

## 2018-10-16 DIAGNOSIS — E78.2 MIXED HYPERLIPIDEMIA: Chronic | ICD-10-CM

## 2018-10-16 PROCEDURE — 90662 IIV NO PRSV INCREASED AG IM: CPT | Performed by: INTERNAL MEDICINE

## 2018-10-16 PROCEDURE — G0009 ADMIN PNEUMOCOCCAL VACCINE: HCPCS | Performed by: INTERNAL MEDICINE

## 2018-10-16 PROCEDURE — G0008 ADMIN INFLUENZA VIRUS VAC: HCPCS | Performed by: INTERNAL MEDICINE

## 2018-10-16 PROCEDURE — 99214 OFFICE O/P EST MOD 30 MIN: CPT | Performed by: INTERNAL MEDICINE

## 2018-10-16 PROCEDURE — 90670 PCV13 VACCINE IM: CPT | Performed by: INTERNAL MEDICINE

## 2018-10-16 NOTE — PROGRESS NOTES
Subjective        History of Present Illness     Camilla Izquierdo is a 89 y.o. Female who comes in for 4-month follow up on multiple medical issues including hyperlipidemia, peripheral vascular disease, carotid artery disease, impaired fasting glucose, osteoporosis, Alzheimer's disease, and multiple other issues.  She continues on Aricept for Alzheimer's dementia.  We added Seroquel at her last visit for sundowning symptoms.  We then increased the dose in August after her daughter reported Camilla was getting up in the middle of the night roaming.  She is resting well and sundowning symptoms are improved with this dose.  She continues on Lexapro for depression.  She continues on a daily stool softener and is not complaining of constipation.       Diabetic control is acceptable for her age.   Weight is up 13 pounds in the past four months.  She lives with her daughter and son-in-law and is well taken care of.  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  We don't want to limit her diet excessively with her age, but did recommend they may want to decrease sugar and desserts.       She is having a flare of fall allergy symptoms, for which I recommended use of daily antishitamine, Claritin.       She struggles with osteoarthritis, reporting lower lumbar back pain, especially with bending over.   I recommended Tylenol for the discomfort.         Blood pressure at goal.      She is due pneumonia vaccines.  She is given influenza and Prevnar today.  We will plan on giving Pneumovax in one year.     The patient's relevant past medical, surgical, and social history was reviewed in Epic.   Lab results are reviewed with the patient today.  Fasting glucose 124. A1c is 6.6.  Total cholesterol above goal at 243 with Lipitor.  HDL 52. . Triglycerides 200.  LFL/HDL ratio 2.9.  Vitamin D at goal.  Renal function declined with creatinine 1.7  "increased from 1.4.       Review of Systems   Constitutional: Negative for chills, fatigue and fever.   HENT: Negative for congestion, ear pain, postnasal drip, sinus pressure and sore throat.    Respiratory: Negative for cough, shortness of breath and wheezing.    Cardiovascular: Negative for chest pain, palpitations and leg swelling.   Gastrointestinal: Negative for abdominal pain, blood in stool, constipation, diarrhea, nausea and vomiting.   Endocrine: Negative for cold intolerance, heat intolerance, polydipsia and polyuria.   Genitourinary: Negative for dysuria, frequency, hematuria and urgency.   Skin: Negative for rash.   Neurological: Negative for syncope and weakness.        Objective     Vitals:    10/16/18 1105   BP: 138/70   Pulse: 64   Temp: 97 °F (36.1 °C)   TempSrc: Oral   Weight: 85.7 kg (189 lb)   Height: 170.2 cm (67\")     Physical Exam   Constitutional: She is oriented to person, place, and time. She appears well-developed and well-nourished. No distress.   Pleasantly demented.  Accompanied by her daughter.   HENT:   Head: Normocephalic and atraumatic.   Nose: Right sinus exhibits no maxillary sinus tenderness and no frontal sinus tenderness. Left sinus exhibits no maxillary sinus tenderness and no frontal sinus tenderness.   Mouth/Throat: Uvula is midline, oropharynx is clear and moist and mucous membranes are normal. No oral lesions. No tonsillar exudate.   Clear postnasal drip.    Eyes: Pupils are equal, round, and reactive to light. Conjunctivae and EOM are normal.   Neck: Trachea normal. Neck supple. No JVD present. Carotid bruit is not present. No tracheal deviation present. No thyroid mass and no thyromegaly present.   Cardiovascular: Normal rate, regular rhythm, normal heart sounds and intact distal pulses.   No extrasystoles are present. PMI is not displaced.    No murmur heard.  Pulmonary/Chest: Effort normal and breath sounds normal. No accessory muscle usage. No respiratory distress. " She has no decreased breath sounds. She has no wheezes. She has no rhonchi. She has no rales.   Abdominal: Soft. Bowel sounds are normal. She exhibits no distension. There is no hepatosplenomegaly. There is no tenderness.   Musculoskeletal:   Arthritic changes of knees and hands       Vascular Status -  Her right foot exhibits normal foot vasculature  and no edema. Her left foot exhibits normal foot vasculature  and no edema.  Lymphadenopathy:     She has no cervical adenopathy.   Neurological: She is alert and oriented to person, place, and time. No cranial nerve deficit. Coordination normal.   Skin: Skin is warm, dry and intact. No rash noted. No cyanosis. Nails show no clubbing.   Psychiatric: She has a normal mood and affect. Her speech is normal and behavior is normal. Judgment and thought content normal.   Vitals reviewed.      Assessment/Plan      She is given Prevnar and influenza vaccine today.  We will plan to give Pneumovax in one year.     Continue the losartan HCT.    Continue calcium and vitamin D supplements.      Avoid NSAIDs and other chronic nephrotoxic drugs due to CKD.  I recommended Claritin for allergy symptoms or Tylenol products for any generalized pain.      Continue with Lipitor 20 mg daily.      Continue other medications and vitamin and mineral supplements to treat additional medical problems which we addressed today.  Return in six months for follow up with fasting labs one week prior.      Scribed for Dr. Barton by Yulia Barros [facility].     Diagnoses and all orders for this visit:    Essential hypertension  -     CBC Auto Differential; Future  -     Comprehensive Metabolic Panel; Future    Need for prophylactic vaccination and inoculation against influenza    Need for prophylactic vaccination against Streptococcus pneumoniae (pneumococcus)    Mixed hyperlipidemia  -     LDL Cholesterol, Direct; Future  -     TSH; Future    Impaired fasting glucose  -     Hemoglobin A1c;  Future    Peripheral vascular disease (CMS/HCC)    Vitamin D deficiency  -     Vitamin D 25 Hydroxy; Future    Late onset Alzheimer's disease with behavioral disturbance    Osteoporosis without current pathological fracture, unspecified osteoporosis type    Primary osteoarthritis of both knees    Depressive disorder    SunDown syndrome    Other orders  -     Fluzone High Dose =>65Years  -     Pneumococcal Conjugate Vaccine 13-Valent All        Lab on 10/09/2018   Component Date Value Ref Range Status   • Glucose 10/09/2018 124* 74 - 99 mg/dL Final   • BUN 10/09/2018 36* 7 - 17 mg/dL Final   • Creatinine 10/09/2018 1.70* 0.52 - 1.04 mg/dL Final   • Sodium 10/09/2018 144  137 - 145 mmol/L Final   • Potassium 10/09/2018 4.4  3.4 - 5.0 mmol/L Final   • Chloride 10/09/2018 111* 98 - 107 mmol/L Final   • CO2 10/09/2018 22.0  22.0 - 30.0 mmol/L Final   • Calcium 10/09/2018 10.6* 8.4 - 10.2 mg/dL Final   • Total Protein 10/09/2018 7.7  6.3 - 8.2 g/dL Final   • Albumin 10/09/2018 4.40  3.50 - 5.00 g/dL Final   • ALT (SGPT) 10/09/2018 11  <=35 U/L Final   • AST (SGOT) 10/09/2018 22  14 - 36 U/L Final   • Alkaline Phosphatase 10/09/2018 107  38 - 126 U/L Final   • Total Bilirubin 10/09/2018 0.6  0.2 - 1.3 mg/dL Final   • eGFR Non African Amer 10/09/2018 28* 39 - 90 mL/min/1.73 Final   • Globulin 10/09/2018 3.3  2.3 - 3.5 gm/dL Final   • A/G Ratio 10/09/2018 1.3  1.1 - 1.8 g/dL Final   • BUN/Creatinine Ratio 10/09/2018 21.2  7.0 - 25.0 Final   • Anion Gap 10/09/2018 11.0  5.0 - 15.0 mmol/L Final   • Hemoglobin A1C 10/09/2018 6.6* 4 - 5.6 % Final   • Total Cholesterol 10/09/2018 243* 150 - 200 mg/dL Final   • Triglycerides 10/09/2018 200* <=150 mg/dL Final   • HDL Cholesterol 10/09/2018 52  40 - 59 mg/dL Final   • LDL Cholesterol  10/09/2018 151* <=100 mg/dL Final   • VLDL Cholesterol 10/09/2018 40  mg/dL Final   • LDL/HDL Ratio 10/09/2018 2.90  0.00 - 3.22 Final   • 25 Hydroxy, Vitamin D 10/09/2018 43.2  30.0 - 100.0 ng/ml  Final   • WBC 10/09/2018 6.07  3.20 - 9.80 10*3/mm3 Final   • RBC 10/09/2018 4.35  3.77 - 5.16 10*6/mm3 Final   • Hemoglobin 10/09/2018 11.8* 12.0 - 15.5 g/dL Final   • Hematocrit 10/09/2018 37.0  35.0 - 45.0 % Final   • MCV 10/09/2018 85.1  80.0 - 98.0 fL Final   • MCH 10/09/2018 27.1  26.5 - 34.0 pg Final   • MCHC 10/09/2018 31.9  31.4 - 36.0 g/dL Final   • RDW 10/09/2018 14.4  11.5 - 14.5 % Final   • RDW-SD 10/09/2018 44.0  36.4 - 46.3 fl Final   • MPV 10/09/2018 10.0  8.0 - 12.0 fL Final   • Platelets 10/09/2018 275  150 - 450 10*3/mm3 Final   • Neutrophil % 10/09/2018 58.7  37.0 - 80.0 % Final   • Lymphocyte % 10/09/2018 27.0  10.0 - 50.0 % Final   • Monocyte % 10/09/2018 8.4  0.0 - 12.0 % Final   • Eosinophil % 10/09/2018 5.4  0.0 - 7.0 % Final   • Basophil % 10/09/2018 0.5  0.0 - 2.0 % Final   • Neutrophils, Absolute 10/09/2018 3.56  2.00 - 8.60 10*3/mm3 Final   • Lymphocytes, Absolute 10/09/2018 1.64  0.60 - 4.20 10*3/mm3 Final   • Monocytes, Absolute 10/09/2018 0.51  0.00 - 0.90 10*3/mm3 Final   • Eosinophils, Absolute 10/09/2018 0.33  0.00 - 0.70 10*3/mm3 Final   • Basophils, Absolute 10/09/2018 0.03  0.00 - 0.20 10*3/mm3 Final   ]

## 2018-11-13 ENCOUNTER — TELEPHONE (OUTPATIENT)
Dept: FAMILY MEDICINE CLINIC | Facility: CLINIC | Age: 83
End: 2018-11-13

## 2018-11-13 NOTE — TELEPHONE ENCOUNTER
"----- Message from Eric Barton MD sent at 11/12/2018  5:15 PM CST -----  May be related to getting dark sooner. Try 5-6 tablets at night for a few nights and let us know the results. It could also indicate a UTI, so watch for that. EB  ----- Message -----  From: Pat Chowdhury RN  Sent: 11/12/2018   1:41 PM  To: Eric Barton MD    Her daughter called and states her mother had been doing fine on the Seroquel 25mg one mid day and 4 at night until this past week and has been up every night \"roaming\". She said she was supposed to call if the medicine stopped working. Pleased advise. TP      11/13/2018 relayed the message to her daughter and she will try that and let us know results. TP  "

## 2018-11-19 RX ORDER — QUETIAPINE FUMARATE 25 MG/1
TABLET, FILM COATED ORAL
Qty: 210 TABLET | Refills: 11 | Status: SHIPPED | OUTPATIENT
Start: 2018-11-19 | End: 2019-03-05 | Stop reason: DRUGHIGH

## 2018-12-12 ENCOUNTER — OFFICE VISIT (OUTPATIENT)
Dept: FAMILY MEDICINE CLINIC | Facility: CLINIC | Age: 83
End: 2018-12-12

## 2018-12-12 VITALS
SYSTOLIC BLOOD PRESSURE: 124 MMHG | DIASTOLIC BLOOD PRESSURE: 60 MMHG | WEIGHT: 188.8 LBS | HEIGHT: 67 IN | TEMPERATURE: 97.7 F | BODY MASS INDEX: 29.63 KG/M2 | HEART RATE: 76 BPM

## 2018-12-12 DIAGNOSIS — K59.04 CHRONIC IDIOPATHIC CONSTIPATION: Chronic | ICD-10-CM

## 2018-12-12 DIAGNOSIS — R09.82 POST-NASAL DRIP: ICD-10-CM

## 2018-12-12 DIAGNOSIS — K21.9 GASTROESOPHAGEAL REFLUX DISEASE, ESOPHAGITIS PRESENCE NOT SPECIFIED: Primary | Chronic | ICD-10-CM

## 2018-12-12 DIAGNOSIS — F05 SUNDOWN SYNDROME: Chronic | ICD-10-CM

## 2018-12-12 DIAGNOSIS — R47.02 DYSPHASIA: ICD-10-CM

## 2018-12-12 PROCEDURE — 99214 OFFICE O/P EST MOD 30 MIN: CPT | Performed by: INTERNAL MEDICINE

## 2018-12-12 RX ORDER — OMEPRAZOLE 40 MG/1
40 CAPSULE, DELAYED RELEASE ORAL DAILY
Qty: 30 CAPSULE | Refills: 11 | Status: SHIPPED | OUTPATIENT
Start: 2018-12-12 | End: 2019-12-23

## 2018-12-12 RX ORDER — FLUTICASONE PROPIONATE 50 MCG
1 SPRAY, SUSPENSION (ML) NASAL 2 TIMES DAILY
Qty: 1 BOTTLE | Refills: 11 | Status: SHIPPED | OUTPATIENT
Start: 2018-12-12

## 2018-12-12 NOTE — PROGRESS NOTES
Subjective        History of Present Illness     Camilla Izquierdo is a 89 y.o. female with multiple medical issues including hyperlipidemia, peripheral vascular disease, carotid artery disease, impaired fasting glucose, osteoporosis, Alzheimer's disease with sundowning, and multiple other issues.  She continues on Aricept and Seroquel for Alzheimer's dementia with sundowning symptoms.  Her daughter-in-law reports her mother is complaining of a burning, irritated sensation in the throat upper.  This was especially noticeable with eating meat a couple of nights ago.  She reports dysphagia symptoms, but not to the point of regurgitation.  She has had noticeable increased belching for the past 2-3 weeks.  She is clearing her throat often as well.  Her daughter has tried giving Claritin without noticeable improvement.  Her symptoms appear to be due to a combination of postnasal drainage and GERD.  She is not currently taking PPI or H2 blocker.  She takes OTC stool softeners to manage constipation.    Seroquel has been very helpful in reducing the agitation associated with her sundowning.  There are some nights in which she is still wanting to roam and get agitated.  Her current dose of Seroquel 25 mg is 1 tablet in the afternoon and 5 tablets in the evening before bedtime.  She gets 210 tablets with each refill.  We discussed how an additional 1 or 2 tablets of Seroquel could be used on any night in which she is agitated.  The daughter-in-law reports no problems with daytime somnolence, and feels that the Seroquel has greatly improved her quality of life.      Review of Systems   Constitutional: Negative for chills, fatigue and fever.   HENT: Negative for congestion, ear pain, postnasal drip, sinus pressure and sore throat.    Respiratory: Negative for cough, shortness of breath and wheezing.    Cardiovascular: Negative for chest pain, palpitations and leg swelling.   Gastrointestinal: Negative for abdominal pain, blood in  "stool, constipation, diarrhea, nausea and vomiting.        Dysphagia.     Endocrine: Negative for cold intolerance, heat intolerance, polydipsia and polyuria.   Genitourinary: Negative for dysuria, frequency, hematuria and urgency.   Skin: Negative for rash.   Neurological: Negative for syncope and weakness.      Objective     Vitals:    12/12/18 1533   BP: 124/60   Pulse: 76   Temp: 97.7 °F (36.5 °C)   TempSrc: Oral   Weight: 85.6 kg (188 lb 12.8 oz)   Height: 170.2 cm (67\")     Physical Exam   Constitutional: She is oriented to person, place, and time. She appears well-developed and well-nourished. No distress.   Pleasantly demented. Accompanied by her daughter.     HENT:   Head: Normocephalic and atraumatic.   Nose: Nose normal.   Mouth/Throat: Oropharynx is clear and moist. No oropharyngeal exudate.   Clear postnasal drip.          Eyes: EOM are normal. Pupils are equal, round, and reactive to light.   Neck: Neck supple. No JVD present. No thyromegaly present.   Cardiovascular: Normal rate, regular rhythm and normal heart sounds.   Pulmonary/Chest: Effort normal and breath sounds normal. No accessory muscle usage. No respiratory distress. She has no wheezes. She has no rales.   Abdominal: Soft. Bowel sounds are normal. She exhibits no distension. There is no tenderness.   Musculoskeletal: She exhibits no edema.     Vascular Status -  Her right foot exhibits abnormal foot vasculature  and abnormal foot edema (Trace edema bilateral lower extremities wtih evidence of venous insufficiency). Her left foot exhibits abnormal foot vasculature  and abnormal foot edema.  Lymphadenopathy:     She has no cervical adenopathy.   Neurological: She is alert and oriented to person, place, and time. No cranial nerve deficit.   Psychiatric: She has a normal mood and affect. Her speech is normal and behavior is normal. Judgment and thought content normal.       Future Appointments   Date Time Provider Department Center   4/16/2019 " 11:15 AM Eric Barton MD [facility] None       Assessment/Plan      The dysphasia/swallowing difficulties is a new problem.  For the next six weeks, I recommended Prilosec (omeprazole) 40 mg each morning chronically adding OTC Prilosec 20 mg with the supper meal for a month.  Prescription is sent for the Prilosec (omeprazole) 40 mg to take one q.a.m. I recommended mechanical soft diet while gastritis is healing.  Encourage the patient to follow each bite of food with sips of liquids.  We will try this conservative approach without GI referral due to her age and frail condition.  They will notify us if symptoms persist after 4-6 weeks.     For the chronic postnasal drainage, I sent a prescription for Flonase to use one spray each nostril b.i.d. for a month     Continue the combination of Aricept and Seroquel.  See my instructions above regarding using some additional Seroquel as needed for agitation.  She appears to be getting excellent, supportive care and her current living situation.    Continue the OTC stool softener and relatively high fiber diet to help deal with chronic constipation.    Return in April for routine follow up with fasting labs one week prior or sooner if needed.      Diagnoses and all orders for this visit:    Gastroesophageal reflux disease, esophagitis presence not specified    Dysphasia    Chronic idiopathic constipation    Post-nasal drip    SunDown syndrome    Other orders  -     fluticasone (FLONASE) 50 MCG/ACT nasal spray; 1 spray into the nostril(s) as directed by provider 2 (Two) Times a Day. Administer 1 spray in each nostril twice daily.  -     omeprazole (PRILOSEC) 40 MG capsule; Take 1 capsule by mouth Daily. For stomach acid or reflux        No visits with results within 3 Week(s) from this visit.   Latest known visit with results is:   Lab on 10/09/2018   Component Date Value Ref Range Status   • Glucose 10/09/2018 124* 74 - 99 mg/dL Final   • BUN 10/09/2018 36* 7 - 17 mg/dL  Final   • Creatinine 10/09/2018 1.70* 0.52 - 1.04 mg/dL Final   • Sodium 10/09/2018 144  137 - 145 mmol/L Final   • Potassium 10/09/2018 4.4  3.4 - 5.0 mmol/L Final   • Chloride 10/09/2018 111* 98 - 107 mmol/L Final   • CO2 10/09/2018 22.0  22.0 - 30.0 mmol/L Final   • Calcium 10/09/2018 10.6* 8.4 - 10.2 mg/dL Final   • Total Protein 10/09/2018 7.7  6.3 - 8.2 g/dL Final   • Albumin 10/09/2018 4.40  3.50 - 5.00 g/dL Final   • ALT (SGPT) 10/09/2018 11  <=35 U/L Final   • AST (SGOT) 10/09/2018 22  14 - 36 U/L Final   • Alkaline Phosphatase 10/09/2018 107  38 - 126 U/L Final   • Total Bilirubin 10/09/2018 0.6  0.2 - 1.3 mg/dL Final   • eGFR Non African Amer 10/09/2018 28* 39 - 90 mL/min/1.73 Final   • Globulin 10/09/2018 3.3  2.3 - 3.5 gm/dL Final   • A/G Ratio 10/09/2018 1.3  1.1 - 1.8 g/dL Final   • BUN/Creatinine Ratio 10/09/2018 21.2  7.0 - 25.0 Final   • Anion Gap 10/09/2018 11.0  5.0 - 15.0 mmol/L Final   • Hemoglobin A1C 10/09/2018 6.6* 4 - 5.6 % Final   • Total Cholesterol 10/09/2018 243* 150 - 200 mg/dL Final   • Triglycerides 10/09/2018 200* <=150 mg/dL Final   • HDL Cholesterol 10/09/2018 52  40 - 59 mg/dL Final   • LDL Cholesterol  10/09/2018 151* <=100 mg/dL Final   • VLDL Cholesterol 10/09/2018 40  mg/dL Final   • LDL/HDL Ratio 10/09/2018 2.90  0.00 - 3.22 Final   • 25 Hydroxy, Vitamin D 10/09/2018 43.2  30.0 - 100.0 ng/ml Final   • WBC 10/09/2018 6.07  3.20 - 9.80 10*3/mm3 Final   • RBC 10/09/2018 4.35  3.77 - 5.16 10*6/mm3 Final   • Hemoglobin 10/09/2018 11.8* 12.0 - 15.5 g/dL Final   • Hematocrit 10/09/2018 37.0  35.0 - 45.0 % Final   • MCV 10/09/2018 85.1  80.0 - 98.0 fL Final   • MCH 10/09/2018 27.1  26.5 - 34.0 pg Final   • MCHC 10/09/2018 31.9  31.4 - 36.0 g/dL Final   • RDW 10/09/2018 14.4  11.5 - 14.5 % Final   • RDW-SD 10/09/2018 44.0  36.4 - 46.3 fl Final   • MPV 10/09/2018 10.0  8.0 - 12.0 fL Final   • Platelets 10/09/2018 275  150 - 450 10*3/mm3 Final   • Neutrophil % 10/09/2018 58.7  37.0 -  80.0 % Final   • Lymphocyte % 10/09/2018 27.0  10.0 - 50.0 % Final   • Monocyte % 10/09/2018 8.4  0.0 - 12.0 % Final   • Eosinophil % 10/09/2018 5.4  0.0 - 7.0 % Final   • Basophil % 10/09/2018 0.5  0.0 - 2.0 % Final   • Neutrophils, Absolute 10/09/2018 3.56  2.00 - 8.60 10*3/mm3 Final   • Lymphocytes, Absolute 10/09/2018 1.64  0.60 - 4.20 10*3/mm3 Final   • Monocytes, Absolute 10/09/2018 0.51  0.00 - 0.90 10*3/mm3 Final   • Eosinophils, Absolute 10/09/2018 0.33  0.00 - 0.70 10*3/mm3 Final   • Basophils, Absolute 10/09/2018 0.03  0.00 - 0.20 10*3/mm3 Final   ]

## 2019-03-05 ENCOUNTER — TELEPHONE (OUTPATIENT)
Dept: FAMILY MEDICINE CLINIC | Facility: CLINIC | Age: 84
End: 2019-03-05

## 2019-03-05 RX ORDER — QUETIAPINE FUMARATE 50 MG/1
TABLET, FILM COATED ORAL
Qty: 165 TABLET | Refills: 5 | Status: SHIPPED | OUTPATIENT
Start: 2019-03-05 | End: 2019-08-16 | Stop reason: SDUPTHER

## 2019-03-05 NOTE — TELEPHONE ENCOUNTER
03/05/2019 I relayed the instructions to daughter and sent med to pharmacy. TP      ----- Message from Eric Barton MD sent at 3/5/2019 11:13 AM CST -----  Change Seroquel to 50mg 1/2 am, 1/2-1 at sundown and 3-4 QHS . EB   ----- Message -----  From: Pat Chowdhury RN  Sent: 3/5/2019   8:17 AM  To: Eric Barton MD    Her daughter called and states the Seroquel 25mg was working fine. She takes one in the afternoon and 6 at bedtime but for the last several days she has been getting up several times a night and more resistant to instructions. They want to know if they can increase? TP  ----- Message -----  From: Ana Del Valle  Sent: 3/4/2019   8:26 AM  To: Pat Chowdhury, KRISTEN    Please call Yamile Orozco regarding adjustment of Camilla's medication [phone]

## 2019-03-27 ENCOUNTER — TELEPHONE (OUTPATIENT)
Dept: FAMILY MEDICINE CLINIC | Facility: CLINIC | Age: 84
End: 2019-03-27

## 2019-03-27 DIAGNOSIS — M54.9 BACK PAIN, UNSPECIFIED BACK LOCATION, UNSPECIFIED BACK PAIN LATERALITY, UNSPECIFIED CHRONICITY: ICD-10-CM

## 2019-03-27 DIAGNOSIS — R41.82 MENTAL STATUS, DECREASED: Primary | ICD-10-CM

## 2019-03-27 NOTE — TELEPHONE ENCOUNTER
Labs were placed. TP        ----- Message from Ana Del Valle sent at 3/27/2019  9:22 AM CDT -----  Patient has an appt for this Friday...    ----- Message -----  From: Eric Barton MD  Sent: 3/27/2019   9:11 AM  To: Ana Del Valle    Make an appointment.  CMP and CBC and clean-catch urinalysis day of appointment.  ----- Message -----  From: Ana Del Valle  Sent: 3/27/2019   8:46 AM  To: Eric Barton MD    Tisha states, Camilla is declining rapidly, Mental status, back pain.  Please advise with doctors appointment or Home Health to go out to evaluate ?  [phone]

## 2019-03-29 ENCOUNTER — LAB (OUTPATIENT)
Dept: LAB | Facility: OTHER | Age: 84
End: 2019-03-29

## 2019-03-29 ENCOUNTER — OFFICE VISIT (OUTPATIENT)
Dept: FAMILY MEDICINE CLINIC | Facility: CLINIC | Age: 84
End: 2019-03-29

## 2019-03-29 VITALS
WEIGHT: 188 LBS | OXYGEN SATURATION: 96 % | DIASTOLIC BLOOD PRESSURE: 60 MMHG | HEART RATE: 65 BPM | TEMPERATURE: 96.9 F | HEIGHT: 67 IN | SYSTOLIC BLOOD PRESSURE: 130 MMHG | BODY MASS INDEX: 29.51 KG/M2

## 2019-03-29 DIAGNOSIS — G30.1 LATE ONSET ALZHEIMER'S DISEASE WITH BEHAVIORAL DISTURBANCE (HCC): Chronic | ICD-10-CM

## 2019-03-29 DIAGNOSIS — E55.9 VITAMIN D DEFICIENCY: Chronic | ICD-10-CM

## 2019-03-29 DIAGNOSIS — E78.2 MIXED HYPERLIPIDEMIA: Chronic | ICD-10-CM

## 2019-03-29 DIAGNOSIS — I65.29 STENOSIS OF CAROTID ARTERY, UNSPECIFIED LATERALITY: Chronic | ICD-10-CM

## 2019-03-29 DIAGNOSIS — F05 SUNDOWN SYNDROME: Chronic | ICD-10-CM

## 2019-03-29 DIAGNOSIS — K59.04 CHRONIC IDIOPATHIC CONSTIPATION: Chronic | ICD-10-CM

## 2019-03-29 DIAGNOSIS — I10 ESSENTIAL HYPERTENSION: Chronic | ICD-10-CM

## 2019-03-29 DIAGNOSIS — N17.9 ACUTE RENAL FAILURE, UNSPECIFIED ACUTE RENAL FAILURE TYPE (HCC): Primary | ICD-10-CM

## 2019-03-29 DIAGNOSIS — K21.9 GASTROESOPHAGEAL REFLUX DISEASE, ESOPHAGITIS PRESENCE NOT SPECIFIED: Chronic | ICD-10-CM

## 2019-03-29 DIAGNOSIS — M81.0 OSTEOPOROSIS WITHOUT CURRENT PATHOLOGICAL FRACTURE, UNSPECIFIED OSTEOPOROSIS TYPE: Chronic | ICD-10-CM

## 2019-03-29 DIAGNOSIS — N18.30 STAGE 3 CHRONIC KIDNEY DISEASE (HCC): Chronic | ICD-10-CM

## 2019-03-29 DIAGNOSIS — J30.1 SEASONAL ALLERGIC RHINITIS DUE TO POLLEN: Chronic | ICD-10-CM

## 2019-03-29 DIAGNOSIS — E86.0 DEHYDRATION: ICD-10-CM

## 2019-03-29 DIAGNOSIS — R41.82 MENTAL STATUS, DECREASED: ICD-10-CM

## 2019-03-29 DIAGNOSIS — M17.0 PRIMARY OSTEOARTHRITIS OF BOTH KNEES: Chronic | ICD-10-CM

## 2019-03-29 DIAGNOSIS — M54.9 BACK PAIN, UNSPECIFIED BACK LOCATION, UNSPECIFIED BACK PAIN LATERALITY, UNSPECIFIED CHRONICITY: ICD-10-CM

## 2019-03-29 DIAGNOSIS — R73.01 IMPAIRED FASTING GLUCOSE: Chronic | ICD-10-CM

## 2019-03-29 DIAGNOSIS — N30.00 ACUTE CYSTITIS WITHOUT HEMATURIA: ICD-10-CM

## 2019-03-29 DIAGNOSIS — F02.818 LATE ONSET ALZHEIMER'S DISEASE WITH BEHAVIORAL DISTURBANCE (HCC): Chronic | ICD-10-CM

## 2019-03-29 LAB
ALBUMIN SERPL-MCNC: 4.4 G/DL (ref 3.5–5)
ALBUMIN/GLOB SERPL: 1.3 G/DL (ref 1.1–1.8)
ALP SERPL-CCNC: 128 U/L (ref 38–126)
ALT SERPL W P-5'-P-CCNC: 12 U/L
ANION GAP SERPL CALCULATED.3IONS-SCNC: 12 MMOL/L (ref 5–15)
AST SERPL-CCNC: 21 U/L (ref 14–36)
BACTERIA UR QL AUTO: ABNORMAL /HPF
BASOPHILS # BLD AUTO: 0.03 10*3/MM3 (ref 0–0.2)
BASOPHILS NFR BLD AUTO: 0.5 % (ref 0–1.5)
BILIRUB SERPL-MCNC: 0.4 MG/DL (ref 0.2–1.3)
BILIRUB UR QL STRIP: NEGATIVE
BUN BLD-MCNC: 72 MG/DL (ref 7–23)
BUN/CREAT SERPL: 16 (ref 7–25)
CALCIUM SPEC-SCNC: 10.4 MG/DL (ref 8.4–10.2)
CHLORIDE SERPL-SCNC: 114 MMOL/L (ref 101–112)
CLARITY UR: ABNORMAL
CO2 SERPL-SCNC: 18 MMOL/L (ref 22–30)
COLOR UR: YELLOW
CREAT BLD-MCNC: 4.5 MG/DL (ref 0.52–1.04)
DEPRECATED RDW RBC AUTO: 45.1 FL (ref 37–54)
EOSINOPHIL # BLD AUTO: 0.26 10*3/MM3 (ref 0–0.4)
EOSINOPHIL NFR BLD AUTO: 4 % (ref 0.3–6.2)
ERYTHROCYTE [DISTWIDTH] IN BLOOD BY AUTOMATED COUNT: 14.7 % (ref 12.3–15.4)
GFR SERPL CREATININE-BSD FRML MDRD: 9 ML/MIN/1.73 (ref 39–90)
GFR SERPL CREATININE-BSD FRML MDRD: ABNORMAL ML/MIN/1.73 (ref 39–90)
GLOBULIN UR ELPH-MCNC: 3.3 GM/DL (ref 2.3–3.5)
GLUCOSE BLD-MCNC: 117 MG/DL (ref 70–99)
GLUCOSE UR STRIP-MCNC: NEGATIVE MG/DL
HCT VFR BLD AUTO: 33.5 % (ref 34–46.6)
HGB BLD-MCNC: 10.4 G/DL (ref 12–15.9)
HGB UR QL STRIP.AUTO: NEGATIVE
HYALINE CASTS UR QL AUTO: ABNORMAL /LPF
KETONES UR QL STRIP: NEGATIVE
LEUKOCYTE ESTERASE UR QL STRIP.AUTO: ABNORMAL
LYMPHOCYTES # BLD AUTO: 1.54 10*3/MM3 (ref 0.7–3.1)
LYMPHOCYTES NFR BLD AUTO: 23.6 % (ref 19.6–45.3)
MCH RBC QN AUTO: 26.7 PG (ref 26.6–33)
MCHC RBC AUTO-ENTMCNC: 31 G/DL (ref 31.5–35.7)
MCV RBC AUTO: 85.9 FL (ref 79–97)
MONOCYTES # BLD AUTO: 0.61 10*3/MM3 (ref 0.1–0.9)
MONOCYTES NFR BLD AUTO: 9.4 % (ref 5–12)
MUCOUS THREADS URNS QL MICRO: ABNORMAL /HPF
NEUTROPHILS # BLD AUTO: 4.08 10*3/MM3 (ref 1.4–7)
NEUTROPHILS NFR BLD AUTO: 62.5 % (ref 42.7–76)
NITRITE UR QL STRIP: NEGATIVE
PH UR STRIP.AUTO: <=5 [PH] (ref 5.5–8)
PLATELET # BLD AUTO: 310 10*3/MM3 (ref 140–450)
PMV BLD AUTO: 9.5 FL (ref 6–12)
POTASSIUM BLD-SCNC: 4.8 MMOL/L (ref 3.4–5)
PROT SERPL-MCNC: 7.7 G/DL (ref 6.3–8.6)
PROT UR QL STRIP: NEGATIVE
RBC # BLD AUTO: 3.9 10*6/MM3 (ref 3.77–5.28)
RBC # UR: ABNORMAL /HPF
REF LAB TEST METHOD: ABNORMAL
SODIUM BLD-SCNC: 144 MMOL/L (ref 137–145)
SP GR UR STRIP: 1.01 (ref 1–1.03)
SQUAMOUS #/AREA URNS HPF: ABNORMAL /HPF
UROBILINOGEN UR QL STRIP: ABNORMAL
WBC NRBC COR # BLD: 6.52 10*3/MM3 (ref 3.4–10.8)
WBC UR QL AUTO: ABNORMAL /HPF

## 2019-03-29 PROCEDURE — 80053 COMPREHEN METABOLIC PANEL: CPT | Performed by: INTERNAL MEDICINE

## 2019-03-29 PROCEDURE — 81001 URINALYSIS AUTO W/SCOPE: CPT | Performed by: INTERNAL MEDICINE

## 2019-03-29 PROCEDURE — 85025 COMPLETE CBC W/AUTO DIFF WBC: CPT | Performed by: INTERNAL MEDICINE

## 2019-03-29 PROCEDURE — 87086 URINE CULTURE/COLONY COUNT: CPT | Performed by: INTERNAL MEDICINE

## 2019-03-29 PROCEDURE — 36415 COLL VENOUS BLD VENIPUNCTURE: CPT | Performed by: INTERNAL MEDICINE

## 2019-03-29 PROCEDURE — 99214 OFFICE O/P EST MOD 30 MIN: CPT | Performed by: INTERNAL MEDICINE

## 2019-03-29 NOTE — PROGRESS NOTES
Subjective       History of Present Illness     Camilla Izquierdo is a 89 y.o. female with multiple medical issues including hyperlipidemia, peripheral vascular disease, carotid artery disease, impaired fasting glucose, osteoporosis, Alzheimer's disease with sundowning, and multiple other issues who comes in today with her daughter and son-in-law who are reporting 1-week history of worsening back pain and bilateral knee pain, left greater than right consistent with her osteoarthritis and making ambulation difficult and painful. We discussed use of narcotic pain medication being contraindicated due to possibility or urinary retention.  She has been taking two Tylenol repeating two later in the day if needed.      Family reports a notable mental status change with mild functional decline in this patient with sundowning for the last to 7 to 10 days. Patient continues on Seroquel to address this.   Labs reveal evidence of acute renal failure with creatinine 4.5 increased from 1.7 five months ago, as well as UTI and increased lower extremity dependent edema.  She eats a good breakfast, but does not eat well through the rest of the day.  Oral mucosa is slightly thickened on exam today consistent with mild dehydration.  She is also having increased dyspnea on exertion.  Due to the acute renal failure, I recommended hospital admission.  I spoke with Dr. Hernandez, hospitalist, who graciously agrees to direct admission.  Her daughter and son-in-law are requesting admission to [facility].       Review of Systems   Constitutional: Negative for chills, fatigue and fever.   HENT: Negative for congestion, ear pain, postnasal drip, sinus pressure and sore throat.    Respiratory: Negative for cough, shortness of breath and wheezing.    Cardiovascular: Negative for chest pain, palpitations and leg swelling.   Gastrointestinal: Negative for abdominal pain, blood in stool, constipation, diarrhea, nausea and vomiting.   Endocrine: Negative for cold  "intolerance, heat intolerance, polydipsia and polyuria.   Genitourinary: Negative for dysuria, frequency, hematuria and urgency.   Musculoskeletal: Positive for arthralgias and back pain.   Skin: Negative for rash.   Neurological: Negative for syncope and weakness.         Objective     Vitals:    03/29/19 1416   BP: 130/60   Pulse: 65   Temp: 96.9 °F (36.1 °C)   TempSrc: Oral   SpO2: 96%   Weight: 85.3 kg (188 lb)   Height: 170.2 cm (67\")     Physical Exam   Constitutional: She is oriented to person, place, and time. She appears well-developed and well-nourished. No distress.   Pleasantly demented.  Accompanied by her daughter and son-in-law.   HENT:   Head: Normocephalic and atraumatic.   Nose: Nose normal.   Mouth/Throat: Oropharynx is clear and moist. No oropharyngeal exudate.   Slightly thickened oral secretions.    Eyes: EOM are normal. Pupils are equal, round, and reactive to light.   Neck: Neck supple. No JVD present. No thyromegaly present.   Cardiovascular: Normal rate, regular rhythm and normal heart sounds.   Pulmonary/Chest: Effort normal and breath sounds normal. No accessory muscle usage. No respiratory distress. She has no wheezes. She has no rales.   Abdominal: Soft. Bowel sounds are normal. She exhibits no distension. There is no tenderness.   Musculoskeletal: She exhibits no edema.   Arthritic changes of knees and hands      Lymphadenopathy:     She has no cervical adenopathy.   Neurological: She is alert and oriented to person, place, and time. No cranial nerve deficit.   Psychiatric: She has a normal mood and affect. Her speech is normal and behavior is normal. Judgment and thought content normal.     Future Appointments   Date Time Provider Department Center   4/16/2019 11:15 AM Eric Barton MD [facility] None       Assessment/Plan      I recommended hospital admission for acute renal failure with creatinine 4.5 increased from 1.7 five months ago and UTI.  Family is requesting admission to " [facility].   I spoke with Dr. Hernandez, hospitalist, who agrees to direct admission.         Return next month for routine follow up visit with fasting labs one week prior.      Scribed for Dr. Barton by Yulia Barros [facility].     Diagnoses and all orders for this visit:    Acute renal failure, unspecified acute renal failure type (CMS/HCC)    Acute cystitis without hematuria    Primary osteoarthritis of both knees    Dehydration    Stage 3 chronic kidney disease (CMS/HCC)    Late onset Alzheimer's disease with behavioral disturbance    SunDown syndrome    Osteoporosis without current pathological fracture, unspecified osteoporosis type    Gastroesophageal reflux disease, esophagitis presence not specified    Stenosis of carotid artery, unspecified laterality    Essential hypertension    Mixed hyperlipidemia    Seasonal allergic rhinitis due to pollen    Chronic idiopathic constipation    Vitamin D deficiency    Impaired fasting glucose        Lab on 03/29/2019   Component Date Value Ref Range Status   • Glucose 03/29/2019 117* 70 - 99 mg/dL Final   • BUN 03/29/2019 72* 7 - 23 mg/dL Final   • Creatinine 03/29/2019 4.50* 0.52 - 1.04 mg/dL Final   • Sodium 03/29/2019 144  137 - 145 mmol/L Final   • Potassium 03/29/2019 4.8  3.4 - 5.0 mmol/L Final   • Chloride 03/29/2019 114* 101 - 112 mmol/L Final   • CO2 03/29/2019 18.0* 22.0 - 30.0 mmol/L Final   • Calcium 03/29/2019 10.4* 8.4 - 10.2 mg/dL Final   • Total Protein 03/29/2019 7.7  6.3 - 8.6 g/dL Final   • Albumin 03/29/2019 4.40  3.50 - 5.00 g/dL Final   • ALT (SGPT) 03/29/2019 12  <=35 U/L Final   • AST (SGOT) 03/29/2019 21  14 - 36 U/L Final   • Alkaline Phosphatase 03/29/2019 128* 38 - 126 U/L Final   • Total Bilirubin 03/29/2019 0.4  0.2 - 1.3 mg/dL Final   • eGFR Non African Amer 03/29/2019 9* 39 - 90 mL/min/1.73 Final    <15 Indicative of kidney failure.   • eGFR   Amer 03/29/2019   39 - 90 mL/min/1.73 Final    <15 Indicative of kidney failure.   • Globulin  03/29/2019 3.3  2.3 - 3.5 gm/dL Final   • A/G Ratio 03/29/2019 1.3  1.1 - 1.8 g/dL Final   • BUN/Creatinine Ratio 03/29/2019 16.0  7.0 - 25.0 Final   • Anion Gap 03/29/2019 12.0  5.0 - 15.0 mmol/L Final   • WBC 03/29/2019 6.52  3.40 - 10.80 10*3/mm3 Final   • RBC 03/29/2019 3.90  3.77 - 5.28 10*6/mm3 Final   • Hemoglobin 03/29/2019 10.4* 12.0 - 15.9 g/dL Final   • Hematocrit 03/29/2019 33.5* 34.0 - 46.6 % Final   • MCV 03/29/2019 85.9  79.0 - 97.0 fL Final   • MCH 03/29/2019 26.7  26.6 - 33.0 pg Final   • MCHC 03/29/2019 31.0* 31.5 - 35.7 g/dL Final   • RDW 03/29/2019 14.7  12.3 - 15.4 % Final   • RDW-SD 03/29/2019 45.1  37.0 - 54.0 fl Final   • MPV 03/29/2019 9.5  6.0 - 12.0 fL Final   • Platelets 03/29/2019 310  140 - 450 10*3/mm3 Final   • Neutrophil % 03/29/2019 62.5  42.7 - 76.0 % Final   • Lymphocyte % 03/29/2019 23.6  19.6 - 45.3 % Final   • Monocyte % 03/29/2019 9.4  5.0 - 12.0 % Final   • Eosinophil % 03/29/2019 4.0  0.3 - 6.2 % Final   • Basophil % 03/29/2019 0.5  0.0 - 1.5 % Final   • Neutrophils, Absolute 03/29/2019 4.08  1.40 - 7.00 10*3/mm3 Final   • Lymphocytes, Absolute 03/29/2019 1.54  0.70 - 3.10 10*3/mm3 Final   • Monocytes, Absolute 03/29/2019 0.61  0.10 - 0.90 10*3/mm3 Final   • Eosinophils, Absolute 03/29/2019 0.26  0.00 - 0.40 10*3/mm3 Final   • Basophils, Absolute 03/29/2019 0.03  0.00 - 0.20 10*3/mm3 Final   Telephone on 03/27/2019   Component Date Value Ref Range Status   • Color, UA 03/29/2019 Yellow  Yellow, Straw Final   • Appearance, UA 03/29/2019 Slightly Cloudy* Clear Final   • pH, UA 03/29/2019 <=5.0* 5.5 - 8.0 Final   • Specific Gravity, UA 03/29/2019 1.015  1.005 - 1.030 Final   • Glucose, UA 03/29/2019 Negative  Negative Final   • Ketones, UA 03/29/2019 Negative  Negative Final   • Bilirubin, UA 03/29/2019 Negative  Negative Final   • Blood, UA 03/29/2019 Negative  Negative Final   • Protein, UA 03/29/2019 Negative  Negative Final   • Leuk Esterase, UA 03/29/2019 Trace* Negative  Final   • Nitrite, UA 03/29/2019 Negative  Negative Final   • Urobilinogen, UA 03/29/2019 0.2 E.U./dL  0.2 - 1.0 E.U./dL Final   • RBC, UA 03/29/2019 3-5* None Seen /HPF Final   • WBC, UA 03/29/2019 6-12* None Seen /HPF Final   • Bacteria, UA 03/29/2019 1+* None Seen /HPF Final   • Squamous Epithelial Cells, UA 03/29/2019 3-6* None Seen, 0-2 /HPF Final   • Hyaline Casts, UA 03/29/2019 None Seen  None Seen /LPF Final   • Mucus, UA 03/29/2019 Trace  None Seen, Trace /HPF Final   • Methodology 03/29/2019 Manual Light Microscopy   Final   ]

## 2019-03-30 LAB — BACTERIA SPEC AEROBE CULT: NO GROWTH

## 2019-04-08 ENCOUNTER — TELEPHONE (OUTPATIENT)
Dept: FAMILY MEDICINE CLINIC | Facility: CLINIC | Age: 84
End: 2019-04-08

## 2019-04-08 NOTE — TELEPHONE ENCOUNTER
04/08/2019 this was relayed to Long term care and patient daughter. She scheduled an apt for the patient. TP      ----- Message from Eric Barton MD sent at 4/8/2019 12:45 PM CDT -----  Have long-term care drawl TSH, hemoglobin A1c, and vitamin D level this week, unless any of these tests have already been done during this hospitalization.  Send results to our office.  Have them schedule a follow-up appointment with me approximately 1 week after her discharge.  Notifsmith Giles of these instructions.  EB  ----- Message -----  From: Ana Del Valle  Sent: 4/8/2019  10:24 AM  To: Eric Barton MD    Yamile Orozco wanted to know if Camilla needed to keep her follow up appointment on 4-16-19 and to do labs this Friday, since she was being discharged from LTC Friday?  Please advise.

## 2019-04-12 RX ORDER — FERROUS SULFATE 325(65) MG
325 TABLET ORAL 3 TIMES WEEKLY
Qty: 15 TABLET | Refills: 5 | Status: SHIPPED | OUTPATIENT
Start: 2019-04-12 | End: 2021-03-01 | Stop reason: SDUPTHER

## 2019-04-16 ENCOUNTER — TELEPHONE (OUTPATIENT)
Dept: FAMILY MEDICINE CLINIC | Facility: CLINIC | Age: 84
End: 2019-04-16

## 2019-04-16 NOTE — TELEPHONE ENCOUNTER
Relayed to daughter that patient could wear the JOYCE knee highs since  there is so much difficulties using the thigh highs. Instructed she could take off at night and to keep legs elevated as much as possible. REID

## 2019-04-16 NOTE — TELEPHONE ENCOUNTER
04/16/2019 I relayed this info to Chayo at [facility]. TP      ----- Message from Eric Barton MD sent at 4/15/2019 10:15 PM CDT -----  They need to get her in thigh high JOYCE stockings daily.  Keep legs elevated when not ambulating.  EB  ----- Message -----  From: Pat Chowdhury RN  Sent: 4/15/2019   3:53 PM  To: Eric Barton MD    [facility] called and states has 3+ edema ext. But the patient told her you were aware. They just wanted you to know. States she is asymptomatic. Lungs clear, v/s stable. TP

## 2019-04-19 ENCOUNTER — TELEPHONE (OUTPATIENT)
Dept: FAMILY MEDICINE CLINIC | Facility: CLINIC | Age: 84
End: 2019-04-19

## 2019-04-19 NOTE — TELEPHONE ENCOUNTER
Informed daughter and Sharon at [facility] with instructions to increase fluid into and watch for decreased urine output. Her recent labs show elevated BUN/CR. TP

## 2019-04-22 ENCOUNTER — OFFICE VISIT (OUTPATIENT)
Dept: FAMILY MEDICINE CLINIC | Facility: CLINIC | Age: 84
End: 2019-04-22

## 2019-04-22 ENCOUNTER — TELEPHONE (OUTPATIENT)
Dept: FAMILY MEDICINE CLINIC | Facility: CLINIC | Age: 84
End: 2019-04-22

## 2019-04-22 VITALS
WEIGHT: 185 LBS | BODY MASS INDEX: 29.03 KG/M2 | OXYGEN SATURATION: 93 % | SYSTOLIC BLOOD PRESSURE: 136 MMHG | TEMPERATURE: 97.7 F | DIASTOLIC BLOOD PRESSURE: 60 MMHG | HEIGHT: 67 IN | HEART RATE: 67 BPM

## 2019-04-22 DIAGNOSIS — R73.01 IMPAIRED FASTING GLUCOSE: Chronic | ICD-10-CM

## 2019-04-22 DIAGNOSIS — F02.818 LATE ONSET ALZHEIMER'S DISEASE WITH BEHAVIORAL DISTURBANCE (HCC): Chronic | ICD-10-CM

## 2019-04-22 DIAGNOSIS — I87.2 CHRONIC VENOUS INSUFFICIENCY: Chronic | ICD-10-CM

## 2019-04-22 DIAGNOSIS — Z09 HOSPITAL DISCHARGE FOLLOW-UP: ICD-10-CM

## 2019-04-22 DIAGNOSIS — F05 SUNDOWN SYNDROME: Chronic | ICD-10-CM

## 2019-04-22 DIAGNOSIS — D50.9 IRON DEFICIENCY ANEMIA, UNSPECIFIED IRON DEFICIENCY ANEMIA TYPE: Chronic | ICD-10-CM

## 2019-04-22 DIAGNOSIS — I10 ESSENTIAL HYPERTENSION: Chronic | ICD-10-CM

## 2019-04-22 DIAGNOSIS — N17.9 AKI (ACUTE KIDNEY INJURY) (HCC): Primary | ICD-10-CM

## 2019-04-22 DIAGNOSIS — N18.30 STAGE 3 CHRONIC KIDNEY DISEASE (HCC): Chronic | ICD-10-CM

## 2019-04-22 DIAGNOSIS — G30.1 LATE ONSET ALZHEIMER'S DISEASE WITH BEHAVIORAL DISTURBANCE (HCC): Chronic | ICD-10-CM

## 2019-04-22 PROCEDURE — 99214 OFFICE O/P EST MOD 30 MIN: CPT | Performed by: INTERNAL MEDICINE

## 2019-04-22 RX ORDER — DILTIAZEM HYDROCHLORIDE 180 MG/1
180 CAPSULE, COATED, EXTENDED RELEASE ORAL DAILY
Refills: 0 | COMMUNITY
Start: 2019-04-02 | End: 2019-05-20 | Stop reason: SDUPTHER

## 2019-04-22 NOTE — TELEPHONE ENCOUNTER
Spoke to Millicent and Valerie at [facility]. Draw weekly labs x 6 weeks for CMP, CBC. Repeat Ferritin, Vitamin B12 and  Serum iron in 3 weeks and also hemocult x 1. Valerie states they may only have her 2-3 weeks and will send patient to out patient for other labs after discharge. TP

## 2019-04-22 NOTE — PROGRESS NOTES
Subjective        History of Present Illness     Camilla Doe) iis a 89 y.o. female who comes in for a hospital follow up and 6-month follow up on multiple medical issues including hyperlipidemia, peripheral vascular disease, carotid artery disease, impaired fasting glucose, osteoporosis, Alzheimer's disease, and multiple other issues.  She continues on Aricept for Alzheimer's dementia and Seroquel to help with sundowning symptoms.    Patient was admitted to [facility] on 04/02/2019 after experiencing a 1-2 week decline and was found to be dehydrated with UTI.  After appropriately treated on acute care, she was transferred to skilled nursing for physical therapy and rehab.   IV fluids improved renal function with creatinine decreased to her baseline 1.6 on discharge, decreased from4.5 on admission.   However, renal function has declined since discharge with creatinine now 2.8 and BUN elevated.  Family continues to try to encourage increased liquid intake, although, they report decreased appetite.  She normally eats a good meal at breakfast, but doesn't eat much throughout the day.     Seroquel was increased while hospitalized with good results.  Family states she has done fairly well since discharge on 250 mg of Seroquel at bedtime.  She was only up once last night. .        IV Rocephin was used to treat UTI while hospitalized.  Culture grew E-coli treated with a course of Ceftin.  Due to anemia with hemoglobin of 8.2, she had transfusion of 2 units of blood prior to discharge from skilled nursing at [facility].  Baseline hemoglobin is 11.8.  Patient was started on ferrous sulfate 325 mg three days weekly.   Hemoglobin was 10.8 with labs four days ago.        She continues with home physical therapy.  I recommended family also make sure she is getting exercises in between physical therapy sessions as well to help with conditioning.        Labs are reviewed from [facility].  Renal function reveals creatinine 2.8.   "Hemoglobin 8.2. Normal liver function.  Vitamin D 29.6.        Review of Systems   Constitutional: Negative for chills, fatigue and fever.   HENT: Negative for congestion, ear pain, postnasal drip, sinus pressure and sore throat.    Respiratory: Negative for cough, shortness of breath and wheezing.    Cardiovascular: Negative for chest pain, palpitations and leg swelling.   Gastrointestinal: Negative for abdominal pain, blood in stool, constipation, diarrhea, nausea and vomiting.   Endocrine: Negative for cold intolerance, heat intolerance, polydipsia and polyuria.   Genitourinary: Negative for dysuria, frequency, hematuria and urgency.   Skin: Negative for rash.   Neurological: Negative for syncope and weakness.        Objective     Vitals:    04/22/19 1328   BP: 136/60   Pulse: 67   Temp: 97.7 °F (36.5 °C)   TempSrc: Oral   SpO2: 93%   Weight: 83.9 kg (185 lb)   Height: 170.2 cm (67\")     Physical Exam   Constitutional: She is oriented to person, place, and time. She appears well-developed and well-nourished. No distress.   Pleasantly demented. Accompanied by her daughter and son-in-law.    HENT:   Head: Normocephalic and atraumatic.   Nose: Right sinus exhibits no maxillary sinus tenderness and no frontal sinus tenderness. Left sinus exhibits no maxillary sinus tenderness and no frontal sinus tenderness.   Mouth/Throat: Uvula is midline, oropharynx is clear and moist and mucous membranes are normal. No oral lesions. No tonsillar exudate.   Eyes: Conjunctivae and EOM are normal. Pupils are equal, round, and reactive to light.   Neck: Trachea normal. Neck supple. No JVD present. Carotid bruit is not present. No tracheal deviation present. No thyroid mass and no thyromegaly present.   Cardiovascular: Normal rate, regular rhythm, normal heart sounds and intact distal pulses.  No extrasystoles are present. PMI is not displaced.   No murmur heard.  Pulmonary/Chest: Effort normal and breath sounds normal. No accessory " muscle usage. No respiratory distress. She has no decreased breath sounds. She has no wheezes. She has no rhonchi. She has no rales.   Abdominal: Soft. Bowel sounds are normal. She exhibits no distension. There is no hepatosplenomegaly. There is no tenderness.     Vascular Status -  Her right foot exhibits abnormal foot edema (1+ edema, improved somewhat with compression stockings in place. ). Her right foot exhibits normal foot vasculature . Her left foot exhibits abnormal foot edema. Her left foot exhibits normal foot vasculature .  Lymphadenopathy:     She has no cervical adenopathy.   Neurological: She is alert and oriented to person, place, and time. No cranial nerve deficit. Coordination normal.   Skin: Skin is warm, dry and intact. No rash noted. No cyanosis. Nails show no clubbing.   Psychiatric: She has a normal mood and affect. Her speech is normal and behavior is normal. Judgment and thought content normal.   Vitals reviewed.          Assessment/Plan      Continue the current dose of Seroquel for now.  They may be able to decrease the dose if symptoms are adequately controlled on her prior     Home Health will monitor labs weekly for the next few weeks due to the declining renal function.  Encourage the patient's family in increased hydration.  With the declining renal function, I asked family to consider options including readmission if renal function continues to decline. Check stool for hemocult.  Continue the oral iron we recently started. Repeat anemia workup in 3 weeks. Return in one month for follow up with fasting labs one week prior.      I recommended family also make sure she is getting exercises in between physical therapy sessions as well to help with conditioning.           Scribed for Dr. Barton by Yulia Barros [facility].     Diagnoses and all orders for this visit:    MARTA (acute kidney injury) (CMS/[facility])    Stage 3 chronic kidney disease (CMS/[facility])    Iron deficiency anemia, unspecified  iron deficiency anemia type    Chronic venous insufficiency    Essential hypertension    Impaired fasting glucose    Late onset Alzheimer's disease with behavioral disturbance    [facility]    Hospital discharge follow-up    Other orders  -     diltiaZEM CD (CARDIZEM CD) 180 MG 24 hr capsule; Take 180 mg by mouth Daily.        No visits with results within 3 Week(s) from this visit.   Latest known visit with results is:   Lab on 03/29/2019   Component Date Value Ref Range Status   • Glucose 03/29/2019 117* 70 - 99 mg/dL Final   • BUN 03/29/2019 72* 7 - 23 mg/dL Final   • Creatinine 03/29/2019 4.50* 0.52 - 1.04 mg/dL Final   • Sodium 03/29/2019 144  137 - 145 mmol/L Final   • Potassium 03/29/2019 4.8  3.4 - 5.0 mmol/L Final   • Chloride 03/29/2019 114* 101 - 112 mmol/L Final   • CO2 03/29/2019 18.0* 22.0 - 30.0 mmol/L Final   • Calcium 03/29/2019 10.4* 8.4 - 10.2 mg/dL Final   • Total Protein 03/29/2019 7.7  6.3 - 8.6 g/dL Final   • Albumin 03/29/2019 4.40  3.50 - 5.00 g/dL Final   • ALT (SGPT) 03/29/2019 12  <=35 U/L Final   • AST (SGOT) 03/29/2019 21  14 - 36 U/L Final   • Alkaline Phosphatase 03/29/2019 128* 38 - 126 U/L Final   • Total Bilirubin 03/29/2019 0.4  0.2 - 1.3 mg/dL Final   • eGFR Non African Amer 03/29/2019 9* 39 - 90 mL/min/1.73 Final    <15 Indicative of kidney failure.   • eGFR   Amer 03/29/2019   39 - 90 mL/min/1.73 Final    <15 Indicative of kidney failure.   • Globulin 03/29/2019 3.3  2.3 - 3.5 gm/dL Final   • A/G Ratio 03/29/2019 1.3  1.1 - 1.8 g/dL Final   • BUN/Creatinine Ratio 03/29/2019 16.0  7.0 - 25.0 Final   • Anion Gap 03/29/2019 12.0  5.0 - 15.0 mmol/L Final   • WBC 03/29/2019 6.52  3.40 - 10.80 10*3/mm3 Final   • RBC 03/29/2019 3.90  3.77 - 5.28 10*6/mm3 Final   • Hemoglobin 03/29/2019 10.4* 12.0 - 15.9 g/dL Final   • Hematocrit 03/29/2019 33.5* 34.0 - 46.6 % Final   • MCV 03/29/2019 85.9  79.0 - 97.0 fL Final   • MCH 03/29/2019 26.7  26.6 - 33.0 pg Final   • MCHC  03/29/2019 31.0* 31.5 - 35.7 g/dL Final   • RDW 03/29/2019 14.7  12.3 - 15.4 % Final   • RDW-SD 03/29/2019 45.1  37.0 - 54.0 fl Final   • MPV 03/29/2019 9.5  6.0 - 12.0 fL Final   • Platelets 03/29/2019 310  140 - 450 10*3/mm3 Final   • Neutrophil % 03/29/2019 62.5  42.7 - 76.0 % Final   • Lymphocyte % 03/29/2019 23.6  19.6 - 45.3 % Final   • Monocyte % 03/29/2019 9.4  5.0 - 12.0 % Final   • Eosinophil % 03/29/2019 4.0  0.3 - 6.2 % Final   • Basophil % 03/29/2019 0.5  0.0 - 1.5 % Final   • Neutrophils, Absolute 03/29/2019 4.08  1.40 - 7.00 10*3/mm3 Final   • Lymphocytes, Absolute 03/29/2019 1.54  0.70 - 3.10 10*3/mm3 Final   • Monocytes, Absolute 03/29/2019 0.61  0.10 - 0.90 10*3/mm3 Final   • Eosinophils, Absolute 03/29/2019 0.26  0.00 - 0.40 10*3/mm3 Final   • Basophils, Absolute 03/29/2019 0.03  0.00 - 0.20 10*3/mm3 Final   ]

## 2019-05-06 DIAGNOSIS — D53.9 MACROCYTIC ANEMIA: ICD-10-CM

## 2019-05-06 DIAGNOSIS — N18.30 STAGE 3 CHRONIC KIDNEY DISEASE (HCC): Primary | Chronic | ICD-10-CM

## 2019-05-06 DIAGNOSIS — D50.9 IRON DEFICIENCY ANEMIA, UNSPECIFIED IRON DEFICIENCY ANEMIA TYPE: Chronic | ICD-10-CM

## 2019-05-20 RX ORDER — DILTIAZEM HYDROCHLORIDE 180 MG/1
180 CAPSULE, COATED, EXTENDED RELEASE ORAL DAILY
Qty: 30 CAPSULE | Refills: 5 | Status: SHIPPED | OUTPATIENT
Start: 2019-05-20 | End: 2019-11-14 | Stop reason: SDUPTHER

## 2019-05-22 ENCOUNTER — DOCUMENTATION (OUTPATIENT)
Dept: FAMILY MEDICINE CLINIC | Facility: CLINIC | Age: 84
End: 2019-05-22

## 2019-05-22 NOTE — PROGRESS NOTES
Spoke to Chayo [facility] had called wanting to see if the frequency of labs could changed from weekly. Dr. Barton changed to monthly. TP

## 2019-05-23 DIAGNOSIS — D53.9 MACROCYTIC ANEMIA: ICD-10-CM

## 2019-05-23 DIAGNOSIS — D50.9 IRON DEFICIENCY ANEMIA, UNSPECIFIED IRON DEFICIENCY ANEMIA TYPE: Chronic | ICD-10-CM

## 2019-05-23 DIAGNOSIS — N18.30 STAGE 3 CHRONIC KIDNEY DISEASE (HCC): Chronic | ICD-10-CM

## 2019-05-28 ENCOUNTER — OFFICE VISIT (OUTPATIENT)
Dept: FAMILY MEDICINE CLINIC | Facility: CLINIC | Age: 84
End: 2019-05-28

## 2019-05-28 ENCOUNTER — TELEPHONE (OUTPATIENT)
Dept: FAMILY MEDICINE CLINIC | Facility: CLINIC | Age: 84
End: 2019-05-28

## 2019-05-28 VITALS
TEMPERATURE: 97 F | SYSTOLIC BLOOD PRESSURE: 138 MMHG | BODY MASS INDEX: 28.56 KG/M2 | HEART RATE: 68 BPM | WEIGHT: 182 LBS | OXYGEN SATURATION: 95 % | HEIGHT: 67 IN | DIASTOLIC BLOOD PRESSURE: 60 MMHG

## 2019-05-28 DIAGNOSIS — G30.1 LATE ONSET ALZHEIMER'S DISEASE WITH BEHAVIORAL DISTURBANCE (HCC): Chronic | ICD-10-CM

## 2019-05-28 DIAGNOSIS — I87.2 CHRONIC VENOUS INSUFFICIENCY: Chronic | ICD-10-CM

## 2019-05-28 DIAGNOSIS — D53.9 MACROCYTIC ANEMIA: Chronic | ICD-10-CM

## 2019-05-28 DIAGNOSIS — E78.2 MIXED HYPERLIPIDEMIA: Chronic | ICD-10-CM

## 2019-05-28 DIAGNOSIS — F02.818 LATE ONSET ALZHEIMER'S DISEASE WITH BEHAVIORAL DISTURBANCE (HCC): Chronic | ICD-10-CM

## 2019-05-28 DIAGNOSIS — K21.9 GASTROESOPHAGEAL REFLUX DISEASE, ESOPHAGITIS PRESENCE NOT SPECIFIED: Chronic | ICD-10-CM

## 2019-05-28 DIAGNOSIS — F05 SUNDOWN SYNDROME: Chronic | ICD-10-CM

## 2019-05-28 DIAGNOSIS — E55.9 VITAMIN D DEFICIENCY: Chronic | ICD-10-CM

## 2019-05-28 DIAGNOSIS — I10 ESSENTIAL HYPERTENSION: Primary | Chronic | ICD-10-CM

## 2019-05-28 DIAGNOSIS — D50.9 IRON DEFICIENCY ANEMIA, UNSPECIFIED IRON DEFICIENCY ANEMIA TYPE: Chronic | ICD-10-CM

## 2019-05-28 DIAGNOSIS — E78.2 MIXED HYPERLIPIDEMIA: Primary | Chronic | ICD-10-CM

## 2019-05-28 DIAGNOSIS — N18.30 STAGE 3 CHRONIC KIDNEY DISEASE (HCC): Chronic | ICD-10-CM

## 2019-05-28 PROCEDURE — 99214 OFFICE O/P EST MOD 30 MIN: CPT | Performed by: INTERNAL MEDICINE

## 2019-05-28 NOTE — PROGRESS NOTES
Subjective        History of Present Illness     Camilla Izquierdo is a 89 y.o. female who comes in for a 1-month follow up on multiple medical issues including hyperlipidemia, peripheral vascular disease, carotid artery disease, impaired fasting glucose, osteoporosis, Alzheimer's disease, and multiple other issues.  She lives with family with the assistance of sitters as well.  Home Health continues to visit three times a week, which family reports is a tremendous help.  She continues on Aricept for Alzheimer's dementia and Seroquel to help with sundowning symptoms.  Her daughter reports sundowning symptoms are stable and she has been sleeping well with decreasing her Seroquel 50 mg to four times daily.   Her lower extremity edema is improved with wearing compression stockings.      Patient was admitted to [facility] on 04/02/2019 after experiencing a 1-2 week decline and was found to be dehydrated with UTI.  Fluids improved renal function with creatinine decreased to her baseline 1.6 on discharge, decreased from 4.5 on admission.   However, renal function declined after discharge with creatinine now 2.8 and BUN elevated. Home Health has been monitoring labs weekly for the past month due to the declining renal function.   She continues to have declined renal function, although, relatively stable with creatinine 2.6 increased from 2.5 one month ago. Otherwise, fasting glucose 112.  Liver function remains normal.  Hemoglobin 11.4 improved from 10.4 two months ago.     The dysphagia/GERD symptoms are currently adequately managed with Prilosec.  Weight is down 3 pounds.  She continues to enjoy breakfast more than other meals.  Her daughter has also started her on one Boost Nutritional Supplement daily.          When she was here last month, I recommended family help her with home exercises in between physical therapy sessions to help with conditioning. She continues to ambulate around the home and daughter denies any falls  "within the past month.          Review of Systems   Constitutional: Negative for chills, fatigue and fever.   HENT: Negative for congestion, ear pain, postnasal drip, sinus pressure and sore throat.    Respiratory: Negative for cough, shortness of breath and wheezing.    Cardiovascular: Negative for chest pain, palpitations and leg swelling.   Gastrointestinal: Negative for abdominal pain, blood in stool, constipation, diarrhea, nausea and vomiting.   Endocrine: Negative for cold intolerance, heat intolerance, polydipsia and polyuria.   Genitourinary: Negative for dysuria, frequency, hematuria and urgency.   Skin: Negative for rash.   Neurological: Negative for syncope and weakness.        Objective     Vitals:    05/28/19 1301   BP: 138/60   Pulse: 68   Temp: 97 °F (36.1 °C)   TempSrc: Oral   SpO2: 95%   Weight: 82.6 kg (182 lb)   Height: 170.2 cm (67\")       Physical Exam   Constitutional: She is oriented to person, place, and time. She appears well-developed and well-nourished. No distress.   Pleasantly demented. Accompanied by her daughter.   HENT:   Head: Normocephalic and atraumatic.   Nose: Right sinus exhibits no maxillary sinus tenderness and no frontal sinus tenderness. Left sinus exhibits no maxillary sinus tenderness and no frontal sinus tenderness.   Mouth/Throat: Uvula is midline, oropharynx is clear and moist and mucous membranes are normal. No oral lesions. No tonsillar exudate.   Eyes: Conjunctivae and EOM are normal. Pupils are equal, round, and reactive to light.   Neck: Trachea normal. Neck supple. No JVD present. Carotid bruit is not present. No tracheal deviation present. No thyroid mass and no thyromegaly present.   Cardiovascular: Normal rate, regular rhythm, normal heart sounds and intact distal pulses.  No extrasystoles are present. PMI is not displaced.   No murmur heard.  2/6 systolic murmur.     Pulmonary/Chest: Effort normal and breath sounds normal. No accessory muscle usage. No " respiratory distress. She has no decreased breath sounds. She has no wheezes. She has no rhonchi. She has no rales.   Chronic lung sounds.    Abdominal: Soft. Bowel sounds are normal. She exhibits no distension. There is no hepatosplenomegaly. There is no tenderness.     Vascular Status -  Her right foot exhibits normal foot vasculature  and no edema. Her left foot exhibits normal foot vasculature  and no edema.  Lymphadenopathy:     She has no cervical adenopathy.   Neurological: She is alert and oriented to person, place, and time. No cranial nerve deficit. Coordination normal.   Skin: Skin is warm, dry and intact. No rash noted. No cyanosis. Nails show no clubbing.   Psychiatric: She has a normal mood and affect. Her speech is normal and behavior is normal. Judgment and thought content normal.   Vitals reviewed.      Assessment/Plan      Home Health continues to follow patient three times weekly and give monthly B-12 injections.  Patient's family is grateful for this help and reports it works well for patient as well. We will have them continue labs, including repeat FLP in 2-3 weeks.     Renal function remains declined, although relatively stable from one month ago.  Continue to avoid NSAIDS and other nephrotoxic drugs.    Continue current B/P meds.    Continue stockings and management of CVI.    CBC reveals improved hemoglobin.  We will contact Home Health to collect labs for anemia workup with B-12, ferritin and iron.  They continue to follow patient three times weekly and give monthly B-12 injections.      Continue other current meds and supplements to treat the other issues we addressed today.    We will see her back in three months for follow up with labs prior.     Scribed for Dr. Barton by Yulia Barros [facility].     Diagnoses and all orders for this visit:    Essential hypertension  -     CBC Auto Differential; Future  -     Comprehensive Metabolic Panel; Future  -     Lipid Panel; Future    Chronic  venous insufficiency    Gastroesophageal reflux disease, esophagitis presence not specified    Vitamin D deficiency    Late onset Alzheimer's disease with behavioral disturbance    Iron deficiency anemia, unspecified iron deficiency anemia type  -     Ferritin; Future  -     Iron Profile; Future    Macrocytic anemia  -     Vitamin B12; Future    SunDown syndrome    Stage 3 chronic kidney disease (CMS/HCC)    Mixed hyperlipidemia        No visits with results within 3 Week(s) from this visit.   Latest known visit with results is:   Lab on 03/29/2019   Component Date Value Ref Range Status   • Glucose 03/29/2019 117* 70 - 99 mg/dL Final   • BUN 03/29/2019 72* 7 - 23 mg/dL Final   • Creatinine 03/29/2019 4.50* 0.52 - 1.04 mg/dL Final   • Sodium 03/29/2019 144  137 - 145 mmol/L Final   • Potassium 03/29/2019 4.8  3.4 - 5.0 mmol/L Final   • Chloride 03/29/2019 114* 101 - 112 mmol/L Final   • CO2 03/29/2019 18.0* 22.0 - 30.0 mmol/L Final   • Calcium 03/29/2019 10.4* 8.4 - 10.2 mg/dL Final   • Total Protein 03/29/2019 7.7  6.3 - 8.6 g/dL Final   • Albumin 03/29/2019 4.40  3.50 - 5.00 g/dL Final   • ALT (SGPT) 03/29/2019 12  <=35 U/L Final   • AST (SGOT) 03/29/2019 21  14 - 36 U/L Final   • Alkaline Phosphatase 03/29/2019 128* 38 - 126 U/L Final   • Total Bilirubin 03/29/2019 0.4  0.2 - 1.3 mg/dL Final   • eGFR Non African Amer 03/29/2019 9* 39 - 90 mL/min/1.73 Final    <15 Indicative of kidney failure.   • eGFR   Amer 03/29/2019   39 - 90 mL/min/1.73 Final    <15 Indicative of kidney failure.   • Globulin 03/29/2019 3.3  2.3 - 3.5 gm/dL Final   • A/G Ratio 03/29/2019 1.3  1.1 - 1.8 g/dL Final   • BUN/Creatinine Ratio 03/29/2019 16.0  7.0 - 25.0 Final   • Anion Gap 03/29/2019 12.0  5.0 - 15.0 mmol/L Final   • WBC 03/29/2019 6.52  3.40 - 10.80 10*3/mm3 Final   • RBC 03/29/2019 3.90  3.77 - 5.28 10*6/mm3 Final   • Hemoglobin 03/29/2019 10.4* 12.0 - 15.9 g/dL Final   • Hematocrit 03/29/2019 33.5* 34.0 - 46.6 % Final   •  MCV 03/29/2019 85.9  79.0 - 97.0 fL Final   • MCH 03/29/2019 26.7  26.6 - 33.0 pg Final   • MCHC 03/29/2019 31.0* 31.5 - 35.7 g/dL Final   • RDW 03/29/2019 14.7  12.3 - 15.4 % Final   • RDW-SD 03/29/2019 45.1  37.0 - 54.0 fl Final   • MPV 03/29/2019 9.5  6.0 - 12.0 fL Final   • Platelets 03/29/2019 310  140 - 450 10*3/mm3 Final   • Neutrophil % 03/29/2019 62.5  42.7 - 76.0 % Final   • Lymphocyte % 03/29/2019 23.6  19.6 - 45.3 % Final   • Monocyte % 03/29/2019 9.4  5.0 - 12.0 % Final   • Eosinophil % 03/29/2019 4.0  0.3 - 6.2 % Final   • Basophil % 03/29/2019 0.5  0.0 - 1.5 % Final   • Neutrophils, Absolute 03/29/2019 4.08  1.40 - 7.00 10*3/mm3 Final   • Lymphocytes, Absolute 03/29/2019 1.54  0.70 - 3.10 10*3/mm3 Final   • Monocytes, Absolute 03/29/2019 0.61  0.10 - 0.90 10*3/mm3 Final   • Eosinophils, Absolute 03/29/2019 0.26  0.00 - 0.40 10*3/mm3 Final   • Basophils, Absolute 03/29/2019 0.03  0.00 - 0.20 10*3/mm3 Final   ]

## 2019-06-13 RX ORDER — DONEPEZIL HYDROCHLORIDE 5 MG/1
5 TABLET, FILM COATED ORAL NIGHTLY
Qty: 30 TABLET | Refills: 11 | Status: SHIPPED | OUTPATIENT
Start: 2019-06-13 | End: 2020-05-06

## 2019-06-17 RX ORDER — ESCITALOPRAM OXALATE 10 MG/1
TABLET ORAL
Qty: 30 TABLET | Refills: 11 | Status: SHIPPED | OUTPATIENT
Start: 2019-06-17 | End: 2020-06-08

## 2019-06-17 RX ORDER — ESCITALOPRAM OXALATE 10 MG/1
TABLET ORAL
Qty: 30 TABLET | Refills: 11 | OUTPATIENT
Start: 2019-06-17

## 2019-06-28 RX ORDER — ATORVASTATIN CALCIUM 20 MG/1
TABLET, FILM COATED ORAL
Qty: 15 TABLET | Refills: 11 | Status: SHIPPED | OUTPATIENT
Start: 2019-06-28 | End: 2020-06-02

## 2019-08-16 RX ORDER — QUETIAPINE FUMARATE 50 MG/1
TABLET, FILM COATED ORAL
Qty: 165 TABLET | Refills: 5 | Status: SHIPPED | OUTPATIENT
Start: 2019-08-16 | End: 2020-02-17

## 2019-09-03 ENCOUNTER — OFFICE VISIT (OUTPATIENT)
Dept: FAMILY MEDICINE CLINIC | Facility: CLINIC | Age: 84
End: 2019-09-03

## 2019-09-03 VITALS
TEMPERATURE: 98.4 F | OXYGEN SATURATION: 95 % | WEIGHT: 182 LBS | DIASTOLIC BLOOD PRESSURE: 58 MMHG | SYSTOLIC BLOOD PRESSURE: 120 MMHG | BODY MASS INDEX: 28.56 KG/M2 | HEART RATE: 74 BPM | HEIGHT: 67 IN

## 2019-09-03 DIAGNOSIS — K21.9 GASTROESOPHAGEAL REFLUX DISEASE, ESOPHAGITIS PRESENCE NOT SPECIFIED: Chronic | ICD-10-CM

## 2019-09-03 DIAGNOSIS — I10 ESSENTIAL HYPERTENSION: Primary | Chronic | ICD-10-CM

## 2019-09-03 DIAGNOSIS — I87.2 CHRONIC VENOUS INSUFFICIENCY: Chronic | ICD-10-CM

## 2019-09-03 DIAGNOSIS — E53.8 VITAMIN B12 DEFICIENCY: Chronic | ICD-10-CM

## 2019-09-03 DIAGNOSIS — K59.04 CHRONIC IDIOPATHIC CONSTIPATION: Chronic | ICD-10-CM

## 2019-09-03 DIAGNOSIS — E55.9 VITAMIN D DEFICIENCY: Chronic | ICD-10-CM

## 2019-09-03 DIAGNOSIS — F32.A DEPRESSIVE DISORDER: Chronic | ICD-10-CM

## 2019-09-03 DIAGNOSIS — R73.01 IMPAIRED FASTING GLUCOSE: Chronic | ICD-10-CM

## 2019-09-03 DIAGNOSIS — D50.9 IRON DEFICIENCY ANEMIA, UNSPECIFIED IRON DEFICIENCY ANEMIA TYPE: Chronic | ICD-10-CM

## 2019-09-03 DIAGNOSIS — F02.818 LATE ONSET ALZHEIMER'S DISEASE WITH BEHAVIORAL DISTURBANCE (HCC): Chronic | ICD-10-CM

## 2019-09-03 DIAGNOSIS — E78.2 MIXED HYPERLIPIDEMIA: Chronic | ICD-10-CM

## 2019-09-03 DIAGNOSIS — D53.9 MACROCYTIC ANEMIA: Chronic | ICD-10-CM

## 2019-09-03 DIAGNOSIS — N18.30 STAGE 3 CHRONIC KIDNEY DISEASE (HCC): Chronic | ICD-10-CM

## 2019-09-03 DIAGNOSIS — G30.1 LATE ONSET ALZHEIMER'S DISEASE WITH BEHAVIORAL DISTURBANCE (HCC): Chronic | ICD-10-CM

## 2019-09-03 PROCEDURE — 99214 OFFICE O/P EST MOD 30 MIN: CPT | Performed by: INTERNAL MEDICINE

## 2019-09-03 NOTE — PROGRESS NOTES
Subjective     History of Present Illness     Camilla Izquierdo is a 89 y.o. female who comes in for 3-month follow up on with multiple medical issues including hyperlipidemia, peripheral vascular disease, carotid artery disease, impaired fasting glucose, osteoporosis, Alzheimer's disease with sundowning, and multiple other issues.  She continues on Aricept for dementia and Seroquel for sundowning symptoms.  She is currently taking Seroquel 1/2 tablet in the morning, 1/2 tablet mid-day and four tablets in the evening, which is working well to manage sundowning symptoms, which her daughter reports are relatively stable.  She lives with her daughter and son-in-law who provide her care with sitters in the home on two days a week to give some relief.  Home Health also sees patient three days weekly and continue to give B-12 injections to treat the vitamin B-12 deficiency.         She is reluctant to wear compression stockings and elevate lower extremities to help manage chronic venous insufficiency.     She is managing constipation with 2-3 stool softeners.  GERD symptoms are much improved with Prilosec.     She completed home physical therapy through Home Health.  She has not been walking much at home.  I encouraged regular exercise or taking short walks in the house to improve strengthening and conditioning.       The patient's relevant past medical, surgical, and social history was reviewed in [facility].   Lab results dated 07/30/2019 are reviewed with the patient today.  CBC reveals stable anemia.  Fasting glucose 96.  Normal liver function.   Improved renal function with creatinine 1.9 decreased from 2.2.  Total cholesterol 214. HDL 48.  .  Triglycerides 155.  LDL/HDL ratio 4.5.     Review of Systems   Constitutional: Negative for chills, fatigue and fever.   HENT: Negative for congestion, ear pain, postnasal drip, sinus pressure and sore throat.    Respiratory: Negative for cough, shortness of breath and wheezing.   "  Cardiovascular: Negative for chest pain, palpitations and leg swelling.   Gastrointestinal: Negative for abdominal pain, blood in stool, constipation, diarrhea, nausea and vomiting.   Endocrine: Negative for cold intolerance, heat intolerance, polydipsia and polyuria.   Genitourinary: Negative for dysuria, frequency, hematuria and urgency.   Skin: Negative for rash.   Neurological: Negative for syncope and weakness.        Objective     Vitals:    09/03/19 1323   BP: 120/58   Pulse: 74   Temp: 98.4 °F (36.9 °C)   TempSrc: Oral   SpO2: 95%   Weight: 82.6 kg (182 lb)   Height: 170.2 cm (67\")     Physical Exam   Constitutional: She is oriented to person, place, and time. She appears well-developed and well-nourished. No distress.   Accompanied by her daughter.    HENT:   Head: Normocephalic and atraumatic.   Nose: Right sinus exhibits no maxillary sinus tenderness and no frontal sinus tenderness. Left sinus exhibits no maxillary sinus tenderness and no frontal sinus tenderness.   Mouth/Throat: Uvula is midline, oropharynx is clear and moist and mucous membranes are normal. No oral lesions. No tonsillar exudate.   Eyes: Conjunctivae and EOM are normal. Pupils are equal, round, and reactive to light.   Neck: Trachea normal. Neck supple. No JVD present. Carotid bruit is not present. No tracheal deviation present. No thyroid mass and no thyromegaly present.   Cardiovascular: Normal rate, regular rhythm, normal heart sounds and intact distal pulses.  No extrasystoles are present. PMI is not displaced.   No murmur heard.  Regular rhythm.  Distant heart sounds. 2/6 systolic murmur.      Pulmonary/Chest: Effort normal and breath sounds normal. No accessory muscle usage. No respiratory distress. She has no decreased breath sounds. She has no wheezes. She has no rhonchi. She has no rales.   Chronic lung sounds.    Abdominal: Soft. Bowel sounds are normal. She exhibits no distension. There is no hepatosplenomegaly. There is no " tenderness.     Vascular Status -  Her right foot exhibits normal foot vasculature  and no edema. Her left foot exhibits normal foot vasculature  and no edema.  Lymphadenopathy:     She has no cervical adenopathy.   Neurological: She is alert and oriented to person, place, and time. No cranial nerve deficit. Coordination normal.   Skin: Skin is warm, dry and intact. No rash noted. No cyanosis. Nails show no clubbing.   Psychiatric: She has a normal mood and affect. Her speech is normal and behavior is normal. Judgment and thought content normal.   Vitals reviewed.          Assessment/Plan       I encouraged regular exercise or taking short walks in the house to improve strengthening and conditioning.  Home Health continues to follow patient three times weekly and give monthly B-12 injections.  Patient's family is grateful for this help and reports it works well for patient as well. We will have them continue labs.     Renal function improved, although not at goal.  Continue to avoid NSAIDS and other nephrotoxic drugs.     Continue current B/P meds.    Continue Aricept for Alzheimer's dementia and Seroquel to help with sundowning symptoms.      I continue to recommended compression stockings and elevating lower extremities when not ambulating, although, patient is very reluctant due to her dementia.     Return in four months for follow up.  We will place orders for Home Health to draw fasting labs prior to appointment.         Scribed for Dr. Barton by Yulia Barros [facility].     Diagnoses and all orders for this visit:    Essential hypertension    Chronic venous insufficiency    Mixed hyperlipidemia  -     LDL Cholesterol, Direct; Future  -     TSH; Future    Chronic idiopathic constipation    Gastroesophageal reflux disease, esophagitis presence not specified    Vitamin D deficiency  -     Vitamin D 25 Hydroxy; Future    Impaired fasting glucose    Late onset Alzheimer's disease with behavioral  disturbance    Stage 3 chronic kidney disease (CMS/HCC)    Iron deficiency anemia, unspecified iron deficiency anemia type    Macrocytic anemia    Depressive disorder    Vitamin B12 deficiency        No visits with results within 3 Week(s) from this visit.   Latest known visit with results is:   Lab on 03/29/2019   Component Date Value Ref Range Status   • Glucose 03/29/2019 117* 70 - 99 mg/dL Final   • BUN 03/29/2019 72* 7 - 23 mg/dL Final   • Creatinine 03/29/2019 4.50* 0.52 - 1.04 mg/dL Final   • Sodium 03/29/2019 144  137 - 145 mmol/L Final   • Potassium 03/29/2019 4.8  3.4 - 5.0 mmol/L Final   • Chloride 03/29/2019 114* 101 - 112 mmol/L Final   • CO2 03/29/2019 18.0* 22.0 - 30.0 mmol/L Final   • Calcium 03/29/2019 10.4* 8.4 - 10.2 mg/dL Final   • Total Protein 03/29/2019 7.7  6.3 - 8.6 g/dL Final   • Albumin 03/29/2019 4.40  3.50 - 5.00 g/dL Final   • ALT (SGPT) 03/29/2019 12  <=35 U/L Final   • AST (SGOT) 03/29/2019 21  14 - 36 U/L Final   • Alkaline Phosphatase 03/29/2019 128* 38 - 126 U/L Final   • Total Bilirubin 03/29/2019 0.4  0.2 - 1.3 mg/dL Final   • eGFR Non African Amer 03/29/2019 9* 39 - 90 mL/min/1.73 Final    <15 Indicative of kidney failure.   • eGFR   Amer 03/29/2019   39 - 90 mL/min/1.73 Final    <15 Indicative of kidney failure.   • Globulin 03/29/2019 3.3  2.3 - 3.5 gm/dL Final   • A/G Ratio 03/29/2019 1.3  1.1 - 1.8 g/dL Final   • BUN/Creatinine Ratio 03/29/2019 16.0  7.0 - 25.0 Final   • Anion Gap 03/29/2019 12.0  5.0 - 15.0 mmol/L Final   • WBC 03/29/2019 6.52  3.40 - 10.80 10*3/mm3 Final   • RBC 03/29/2019 3.90  3.77 - 5.28 10*6/mm3 Final   • Hemoglobin 03/29/2019 10.4* 12.0 - 15.9 g/dL Final   • Hematocrit 03/29/2019 33.5* 34.0 - 46.6 % Final   • MCV 03/29/2019 85.9  79.0 - 97.0 fL Final   • MCH 03/29/2019 26.7  26.6 - 33.0 pg Final   • MCHC 03/29/2019 31.0* 31.5 - 35.7 g/dL Final   • RDW 03/29/2019 14.7  12.3 - 15.4 % Final   • RDW-SD 03/29/2019 45.1  37.0 - 54.0 fl Final   • MPV  03/29/2019 9.5  6.0 - 12.0 fL Final   • Platelets 03/29/2019 310  140 - 450 10*3/mm3 Final   • Neutrophil % 03/29/2019 62.5  42.7 - 76.0 % Final   • Lymphocyte % 03/29/2019 23.6  19.6 - 45.3 % Final   • Monocyte % 03/29/2019 9.4  5.0 - 12.0 % Final   • Eosinophil % 03/29/2019 4.0  0.3 - 6.2 % Final   • Basophil % 03/29/2019 0.5  0.0 - 1.5 % Final   • Neutrophils, Absolute 03/29/2019 4.08  1.40 - 7.00 10*3/mm3 Final   • Lymphocytes, Absolute 03/29/2019 1.54  0.70 - 3.10 10*3/mm3 Final   • Monocytes, Absolute 03/29/2019 0.61  0.10 - 0.90 10*3/mm3 Final   • Eosinophils, Absolute 03/29/2019 0.26  0.00 - 0.40 10*3/mm3 Final   • Basophils, Absolute 03/29/2019 0.03  0.00 - 0.20 10*3/mm3 Final   ]

## 2019-10-11 ENCOUNTER — TELEPHONE (OUTPATIENT)
Dept: FAMILY MEDICINE CLINIC | Facility: CLINIC | Age: 84
End: 2019-10-11

## 2019-10-11 NOTE — TELEPHONE ENCOUNTER
[facility] called and states patient legs have been swelling so they are going to go weekly and access. TP

## 2019-10-23 ENCOUNTER — CLINICAL SUPPORT (OUTPATIENT)
Dept: FAMILY MEDICINE CLINIC | Facility: CLINIC | Age: 84
End: 2019-10-23

## 2019-10-23 DIAGNOSIS — Z23 FLU VACCINE NEED: Primary | ICD-10-CM

## 2019-10-23 PROCEDURE — G0008 ADMIN INFLUENZA VIRUS VAC: HCPCS | Performed by: INTERNAL MEDICINE

## 2019-10-23 PROCEDURE — 90653 IIV ADJUVANT VACCINE IM: CPT | Performed by: INTERNAL MEDICINE

## 2019-11-14 RX ORDER — DILTIAZEM HYDROCHLORIDE 180 MG/1
180 CAPSULE, COATED, EXTENDED RELEASE ORAL DAILY
Qty: 30 CAPSULE | Refills: 5 | Status: SHIPPED | OUTPATIENT
Start: 2019-11-14 | End: 2020-05-06

## 2019-12-23 RX ORDER — OMEPRAZOLE 40 MG/1
40 CAPSULE, DELAYED RELEASE ORAL DAILY
Qty: 30 CAPSULE | Refills: 11 | Status: SHIPPED | OUTPATIENT
Start: 2019-12-23 | End: 2020-11-11

## 2020-01-06 DIAGNOSIS — N18.30 STAGE 3 CHRONIC KIDNEY DISEASE (HCC): ICD-10-CM

## 2020-01-06 DIAGNOSIS — I10 ESSENTIAL HYPERTENSION: Primary | ICD-10-CM

## 2020-01-06 DIAGNOSIS — D53.9 MACROCYTIC ANEMIA: ICD-10-CM

## 2020-01-06 DIAGNOSIS — E55.9 VITAMIN D DEFICIENCY: ICD-10-CM

## 2020-01-07 ENCOUNTER — OFFICE VISIT (OUTPATIENT)
Dept: FAMILY MEDICINE CLINIC | Facility: CLINIC | Age: 85
End: 2020-01-07

## 2020-01-07 VITALS
HEART RATE: 60 BPM | TEMPERATURE: 97 F | SYSTOLIC BLOOD PRESSURE: 108 MMHG | HEIGHT: 67 IN | WEIGHT: 183 LBS | DIASTOLIC BLOOD PRESSURE: 60 MMHG | BODY MASS INDEX: 28.72 KG/M2

## 2020-01-07 DIAGNOSIS — F02.818 LATE ONSET ALZHEIMER'S DISEASE WITH BEHAVIORAL DISTURBANCE (HCC): Chronic | ICD-10-CM

## 2020-01-07 DIAGNOSIS — I87.2 CHRONIC VENOUS INSUFFICIENCY: Chronic | ICD-10-CM

## 2020-01-07 DIAGNOSIS — N18.30 STAGE 3 CHRONIC KIDNEY DISEASE (HCC): Chronic | ICD-10-CM

## 2020-01-07 DIAGNOSIS — I10 ESSENTIAL HYPERTENSION: Primary | Chronic | ICD-10-CM

## 2020-01-07 DIAGNOSIS — G30.1 LATE ONSET ALZHEIMER'S DISEASE WITH BEHAVIORAL DISTURBANCE (HCC): Chronic | ICD-10-CM

## 2020-01-07 DIAGNOSIS — R73.01 IMPAIRED FASTING GLUCOSE: Chronic | ICD-10-CM

## 2020-01-07 DIAGNOSIS — K21.9 GASTROESOPHAGEAL REFLUX DISEASE, ESOPHAGITIS PRESENCE NOT SPECIFIED: Chronic | ICD-10-CM

## 2020-01-07 DIAGNOSIS — F32.A DEPRESSIVE DISORDER: Chronic | ICD-10-CM

## 2020-01-07 DIAGNOSIS — E53.8 VITAMIN B12 DEFICIENCY: Chronic | ICD-10-CM

## 2020-01-07 DIAGNOSIS — Z23 NEED FOR STREPTOCOCCUS PNEUMONIAE VACCINATION: ICD-10-CM

## 2020-01-07 DIAGNOSIS — E78.2 MIXED HYPERLIPIDEMIA: Chronic | ICD-10-CM

## 2020-01-07 DIAGNOSIS — F05 SUNDOWN SYNDROME: Chronic | ICD-10-CM

## 2020-01-07 DIAGNOSIS — I73.9 PERIPHERAL VASCULAR DISEASE (HCC): Chronic | ICD-10-CM

## 2020-01-07 DIAGNOSIS — D50.9 IRON DEFICIENCY ANEMIA, UNSPECIFIED IRON DEFICIENCY ANEMIA TYPE: Chronic | ICD-10-CM

## 2020-01-07 PROCEDURE — 90732 PPSV23 VACC 2 YRS+ SUBQ/IM: CPT | Performed by: INTERNAL MEDICINE

## 2020-01-07 PROCEDURE — 99214 OFFICE O/P EST MOD 30 MIN: CPT | Performed by: INTERNAL MEDICINE

## 2020-01-07 PROCEDURE — G0009 ADMIN PNEUMOCOCCAL VACCINE: HCPCS | Performed by: INTERNAL MEDICINE

## 2020-01-07 NOTE — PROGRESS NOTES
Subjective        History of Present Illness     Camilla Izquierdo is a 89 y.o. female who comes in for 4-month follow up on with multiple medical issues including hyperlipidemia, peripheral vascular disease, carotid artery disease, impaired fasting glucose, osteoporosis, Alzheimer's disease with sundowning, and multiple other issues.  She continues on Aricept for dementia, Lexapro for anxiety, and Seroquel for sundowning symptoms.  Home Health also sees patient three days weekly and continue to give B-12 injections to treat the vitamin B-12 deficiency.  Her daughter reports she is getting very little weight bearing activity.  She requires assistance with transfers, but is able to ambulate once she is standing.  GERD symptoms adequately managed with Prilosec.  Daughter denies patient has any areas of skin breakdown.     Home Health gave influenza vaccine.        Weight is up 1 pound in the past four months.  She eats a big breakfast, but doesn't eat much throughout the remainder of the day.  Blood pressure at goal.      The patient's relevant past medical, surgical, and social history was reviewed in [facility].   Lab results are reviewed with the patient today.  CBC reveals hemoglobin of 11.2 improved from 10.8.  Fasting glucose 106.  Total cholesterol 241.  HDL 47.  .  Triglycerides 232.   Renal function remains relatively stable, but much improved from last spring.         Review of Systems   Constitutional: Negative for chills, fatigue and fever.   HENT: Negative for congestion, ear pain, postnasal drip, sinus pressure and sore throat.    Respiratory: Negative for cough, shortness of breath and wheezing.    Cardiovascular: Positive for leg swelling. Negative for chest pain and palpitations.   Gastrointestinal: Negative for abdominal pain, blood in stool, constipation, diarrhea, nausea and vomiting.   Endocrine: Negative for cold intolerance, heat intolerance, polydipsia and polyuria.   Genitourinary: Negative for  "dysuria, frequency, hematuria and urgency.   Musculoskeletal: Positive for arthralgias.   Skin: Negative for rash.   Neurological: Negative for syncope and weakness.        Objective     Visit Vitals  /60   Pulse 60   Temp 97 °F (36.1 °C) (Oral)   Ht 170.2 cm (67\")   Wt 83 kg (183 lb)   Breastfeeding No   BMI 28.66 kg/m²     Physical Exam   Constitutional: She is oriented to person, place, and time. She appears well-developed and well-nourished. No distress.   Accompanied by her daughter.  In a wheelchair.    HENT:   Head: Normocephalic and atraumatic.   Nose: Right sinus exhibits no maxillary sinus tenderness and no frontal sinus tenderness. Left sinus exhibits no maxillary sinus tenderness and no frontal sinus tenderness.   Mouth/Throat: Uvula is midline, oropharynx is clear and moist and mucous membranes are normal. No oral lesions. No tonsillar exudate.   Eyes: Pupils are equal, round, and reactive to light. Conjunctivae and EOM are normal.   Neck: Trachea normal. Neck supple. No JVD present. Carotid bruit is not present. No tracheal deviation present. No thyroid mass and no thyromegaly present.   Cardiovascular: Normal rate, regular rhythm and intact distal pulses.  No extrasystoles are present. PMI is not displaced.   Murmur heard.  Regular rhythm.  Distant heart sounds. 2/6 systolic murmur.       Pulmonary/Chest: Effort normal and breath sounds normal. No accessory muscle usage. No respiratory distress. She has no decreased breath sounds. She has no wheezes. She has no rhonchi. She has no rales.   Chronic lung sounds.    Abdominal: Soft. Bowel sounds are normal. She exhibits no distension. There is no hepatosplenomegaly. There is no tenderness.     Vascular Status -  Her right foot exhibits normal foot vasculature  and no edema. Her left foot exhibits normal foot vasculature  and no edema.  Lymphadenopathy:     She has no cervical adenopathy.   Neurological: She is alert and oriented to person, place, " and time. No cranial nerve deficit. Coordination normal.   Skin: Skin is warm, dry and intact. No rash noted. No cyanosis. Nails show no clubbing.   Psychiatric: She has a normal mood and affect. Her speech is normal and behavior is normal. Judgment and thought content normal.   Vitals reviewed.      Assessment/Plan      After informed verbal consent, patient is given Pneumovax.  She had Prevnar 10/2018.  She had influenza vaccine 10/2019.     I encouraged regular exercise or taking short walks in the house to improve strengthening and conditioning.      Home Health continues to follow patient three times weekly and give monthly B-12 injections.     Recommended family continue to monitor patient closely for areas of skin breakdown.     Continue Aricept for Alzheimer's dementia, Lexapro for anxiety/depression, and Seroquel to help with sundowning symptoms.     I continue to recommended compression stockings and elevating lower extremities when not ambulating.  Although patient is very reluctant to wear compression stockings, she does ambulate the lower extremities when not ambulating.     Continue to avoid NSAIDS and other nephrotoxic drugs.     Continue current B/P meds.    Return in four months for follow up.   We will place orders for Home Health to draw fasting labs prior to appointment.  Those labs are in our computer.    Scribed for Dr. Barton by Yulia Barros [facility].     Diagnoses and all orders for this visit:    Essential hypertension    Need for Streptococcus pneumoniae vaccination    Mixed hyperlipidemia    Chronic venous insufficiency    Peripheral vascular disease (CMS/HCC)    Gastroesophageal reflux disease, esophagitis presence not specified    Vitamin B12 deficiency    Impaired fasting glucose    Late onset Alzheimer's disease with behavioral disturbance (CMS/HCC)    Stage 3 chronic kidney disease (CMS/HCC)    Iron deficiency anemia, unspecified iron deficiency anemia type    Depressive  disorder    SunDown syndrome    Other orders  -     Pneumococcal Polysaccharide Vaccine 23-Valent Greater Than or Equal To 1yo Subcutaneous / IM        No visits with results within 3 Week(s) from this visit.   Latest known visit with results is:   Lab on 03/29/2019   Component Date Value Ref Range Status   • Glucose 03/29/2019 117* 70 - 99 mg/dL Final   • BUN 03/29/2019 72* 7 - 23 mg/dL Final   • Creatinine 03/29/2019 4.50* 0.52 - 1.04 mg/dL Final   • Sodium 03/29/2019 144  137 - 145 mmol/L Final   • Potassium 03/29/2019 4.8  3.4 - 5.0 mmol/L Final   • Chloride 03/29/2019 114* 101 - 112 mmol/L Final   • CO2 03/29/2019 18.0* 22.0 - 30.0 mmol/L Final   • Calcium 03/29/2019 10.4* 8.4 - 10.2 mg/dL Final   • Total Protein 03/29/2019 7.7  6.3 - 8.6 g/dL Final   • Albumin 03/29/2019 4.40  3.50 - 5.00 g/dL Final   • ALT (SGPT) 03/29/2019 12  <=35 U/L Final   • AST (SGOT) 03/29/2019 21  14 - 36 U/L Final   • Alkaline Phosphatase 03/29/2019 128* 38 - 126 U/L Final   • Total Bilirubin 03/29/2019 0.4  0.2 - 1.3 mg/dL Final   • eGFR Non African Amer 03/29/2019 9* 39 - 90 mL/min/1.73 Final    <15 Indicative of kidney failure.   • eGFR   Amer 03/29/2019   39 - 90 mL/min/1.73 Final    <15 Indicative of kidney failure.   • Globulin 03/29/2019 3.3  2.3 - 3.5 gm/dL Final   • A/G Ratio 03/29/2019 1.3  1.1 - 1.8 g/dL Final   • BUN/Creatinine Ratio 03/29/2019 16.0  7.0 - 25.0 Final   • Anion Gap 03/29/2019 12.0  5.0 - 15.0 mmol/L Final   • WBC 03/29/2019 6.52  3.40 - 10.80 10*3/mm3 Final   • RBC 03/29/2019 3.90  3.77 - 5.28 10*6/mm3 Final   • Hemoglobin 03/29/2019 10.4* 12.0 - 15.9 g/dL Final   • Hematocrit 03/29/2019 33.5* 34.0 - 46.6 % Final   • MCV 03/29/2019 85.9  79.0 - 97.0 fL Final   • MCH 03/29/2019 26.7  26.6 - 33.0 pg Final   • MCHC 03/29/2019 31.0* 31.5 - 35.7 g/dL Final   • RDW 03/29/2019 14.7  12.3 - 15.4 % Final   • RDW-SD 03/29/2019 45.1  37.0 - 54.0 fl Final   • MPV 03/29/2019 9.5  6.0 - 12.0 fL Final   •  Platelets 03/29/2019 310  140 - 450 10*3/mm3 Final   • Neutrophil % 03/29/2019 62.5  42.7 - 76.0 % Final   • Lymphocyte % 03/29/2019 23.6  19.6 - 45.3 % Final   • Monocyte % 03/29/2019 9.4  5.0 - 12.0 % Final   • Eosinophil % 03/29/2019 4.0  0.3 - 6.2 % Final   • Basophil % 03/29/2019 0.5  0.0 - 1.5 % Final   • Neutrophils, Absolute 03/29/2019 4.08  1.40 - 7.00 10*3/mm3 Final   • Lymphocytes, Absolute 03/29/2019 1.54  0.70 - 3.10 10*3/mm3 Final   • Monocytes, Absolute 03/29/2019 0.61  0.10 - 0.90 10*3/mm3 Final   • Eosinophils, Absolute 03/29/2019 0.26  0.00 - 0.40 10*3/mm3 Final   • Basophils, Absolute 03/29/2019 0.03  0.00 - 0.20 10*3/mm3 Final   ]

## 2020-02-17 RX ORDER — QUETIAPINE FUMARATE 50 MG/1
TABLET, FILM COATED ORAL
Qty: 165 TABLET | Refills: 5 | Status: SHIPPED | OUTPATIENT
Start: 2020-02-17 | End: 2020-08-03

## 2020-03-06 RX ORDER — CYANOCOBALAMIN 1000 UG/ML
INJECTION, SOLUTION INTRAMUSCULAR; SUBCUTANEOUS
Qty: 1 ML | Refills: 11 | Status: SHIPPED | OUTPATIENT
Start: 2020-03-06 | End: 2021-01-27

## 2020-05-06 RX ORDER — DILTIAZEM HYDROCHLORIDE 180 MG/1
180 CAPSULE, COATED, EXTENDED RELEASE ORAL DAILY
Qty: 30 CAPSULE | Refills: 5 | Status: SHIPPED | OUTPATIENT
Start: 2020-05-06 | End: 2020-09-29

## 2020-05-06 RX ORDER — DONEPEZIL HYDROCHLORIDE 5 MG/1
5 TABLET, FILM COATED ORAL NIGHTLY
Qty: 30 TABLET | Refills: 5 | Status: SHIPPED | OUTPATIENT
Start: 2020-05-06 | End: 2020-11-30

## 2020-06-02 RX ORDER — ATORVASTATIN CALCIUM 20 MG/1
TABLET, FILM COATED ORAL
Qty: 15 TABLET | Refills: 5 | Status: SHIPPED | OUTPATIENT
Start: 2020-06-02 | End: 2020-10-29

## 2020-06-08 RX ORDER — ESCITALOPRAM OXALATE 10 MG/1
TABLET ORAL
Qty: 30 TABLET | Refills: 11 | Status: SHIPPED | OUTPATIENT
Start: 2020-06-08 | End: 2021-04-27

## 2020-06-16 ENCOUNTER — OFFICE VISIT (OUTPATIENT)
Dept: FAMILY MEDICINE CLINIC | Facility: CLINIC | Age: 85
End: 2020-06-16

## 2020-06-16 VITALS — WEIGHT: 183 LBS | BODY MASS INDEX: 28.72 KG/M2 | HEIGHT: 67 IN

## 2020-06-16 DIAGNOSIS — K21.9 GASTROESOPHAGEAL REFLUX DISEASE, ESOPHAGITIS PRESENCE NOT SPECIFIED: Chronic | ICD-10-CM

## 2020-06-16 DIAGNOSIS — M17.0 PRIMARY OSTEOARTHRITIS OF BOTH KNEES: Chronic | ICD-10-CM

## 2020-06-16 DIAGNOSIS — E53.8 VITAMIN B12 DEFICIENCY: Primary | ICD-10-CM

## 2020-06-16 DIAGNOSIS — D50.9 IRON DEFICIENCY ANEMIA, UNSPECIFIED IRON DEFICIENCY ANEMIA TYPE: Chronic | ICD-10-CM

## 2020-06-16 DIAGNOSIS — D53.9 MACROCYTIC ANEMIA: Chronic | ICD-10-CM

## 2020-06-16 DIAGNOSIS — N18.30 STAGE 3 CHRONIC KIDNEY DISEASE (HCC): Chronic | ICD-10-CM

## 2020-06-16 DIAGNOSIS — I10 ESSENTIAL HYPERTENSION: Chronic | ICD-10-CM

## 2020-06-16 DIAGNOSIS — E78.2 MIXED HYPERLIPIDEMIA: Chronic | ICD-10-CM

## 2020-06-16 DIAGNOSIS — G30.1 LATE ONSET ALZHEIMER'S DISEASE WITH BEHAVIORAL DISTURBANCE (HCC): Chronic | ICD-10-CM

## 2020-06-16 DIAGNOSIS — F05 SUNDOWN SYNDROME: Chronic | ICD-10-CM

## 2020-06-16 DIAGNOSIS — F02.818 LATE ONSET ALZHEIMER'S DISEASE WITH BEHAVIORAL DISTURBANCE (HCC): Chronic | ICD-10-CM

## 2020-06-16 DIAGNOSIS — I87.2 CHRONIC VENOUS INSUFFICIENCY: Chronic | ICD-10-CM

## 2020-06-16 DIAGNOSIS — I73.9 PERIPHERAL VASCULAR DISEASE (HCC): Chronic | ICD-10-CM

## 2020-06-16 PROCEDURE — G2025 DIS SITE TELE SVCS RHC/FQHC: HCPCS | Performed by: INTERNAL MEDICINE

## 2020-06-16 RX ORDER — ACETAMINOPHEN 500 MG
500 TABLET ORAL EVERY 4 HOURS PRN
COMMUNITY
Start: 2020-06-16

## 2020-06-16 NOTE — PROGRESS NOTES
Subjective          History of Present Illness     .You have chosen to receive care through a telephone visit. Do you consent to use a telephone visit for your medical care today? Yes  Total visit time: 23 minutes    Camilla Izquierdo is a 89 y.o. female who receives care via telephone visit with the assistance of her daughter for 4-month follow up on with multiple medical issues including hyperlipidemia, peripheral vascular disease, carotid artery disease, impaired fasting glucose, osteoporosis, Alzheimer's disease with sundowning, and multiple other issues.  She continues on Aricept for dementia, Lexapro for anxiety, and Seroquel for sundowning symptoms and agitation, which is adequately managing her agitation with 25 mg in the morning, 25-50 in the afternoon, and 200 mg at bedtime. However, her memory loss continues to progress as the patient celebrates her 89 birthday today.   She has just recently resumed the nurse home visits through [facility] after taking a break due to the Covid-19 restrictions.  Her daughter feels this service is extremely beneficial and needed to administer her monthly B-12 injections.  They also check her BP and heart rate with visits, for which her daughter reports has been at goal.      She continues to struggle with lower extremity edema due to chronic venous insufficiency, although, her daughter states it is hard to get her to elevate her lower extremities and she refuses to wear compression stockings.  Given her age, her daughter has gotten to a point where she is not forcing the issues.  I suggested some comfortable socks, even through providing less external compression, may provide some benefit.     Her daughter reports patient complains of generalized arthralgias as well as knee pain.  She has been giving extra strength Tylenol episodically.  I explained how she may be to a point she would benefit from routine dosing of the extra strength Tylenol up to 2500 mg daily to help  "manage pain.             The patient's relevant past medical, surgical, and social history was reviewed in Epic.   Lab results are reviewed with the patient today.  Hemoglobin remains 10.9.   Renal function stable with creatinine 2.0. Normal liver function. Iron at goal.      Review of Systems   Constitutional: Negative for chills, fatigue and fever.   HENT: Negative for congestion, ear pain, postnasal drip, sinus pressure and sore throat.    Respiratory: Negative for cough, shortness of breath and wheezing.    Cardiovascular: Negative for chest pain, palpitations and leg swelling.   Gastrointestinal: Negative for abdominal pain, blood in stool, constipation, diarrhea, nausea and vomiting.   Endocrine: Negative for cold intolerance, heat intolerance, polydipsia and polyuria.   Genitourinary: Negative for dysuria, frequency, hematuria and urgency.   Skin: Negative for rash.   Neurological: Negative for syncope and weakness.        Objective     Visit Vitals  Ht 170.2 cm (67\")   Wt 83 kg (183 lb)   BMI 28.66 kg/m²     Physical Exam      Assessment/Plan      I have recommended Home Health continue to follow patient 2-3 times weekly and give monthly B-12 injections, which is very beneficial. They also check vital signs and provide needed services.       Continue Aricept for Alzheimer's dementia, Lexapro for anxiety/depression, and Seroquel to help with sundowning symptoms, which are stable.     Continue Prilosec for GERD, which is stable.     She can increase the extra strength Tylenol up to 2500 mg daily to help manage generalized pain.     Since she is reluctant to wear compression stockings, I recommended a pair of tall, comfortable socks, although, less external compression, this should be beneficial.   Continue to try to get patient to elevate the lower extremities when not ambulating.     We will repeat telephone visit in four months to follow up on chronic medical issues.  Home Health continues to draw labs.  I " have reviewed and ordered labs for October.    Scribed for Dr. Barton by Yulia Barros [facility].     Diagnoses and all orders for this visit:    Vitamin B12 deficiency    Essential hypertension    Mixed hyperlipidemia    Chronic venous insufficiency    Gastroesophageal reflux disease, esophagitis presence not specified    Stage 3 chronic kidney disease (CMS/HCC)    SunDown syndrome    Peripheral vascular disease (CMS/HCC)    Late onset Alzheimer's disease with behavioral disturbance (CMS/HCC)    Iron deficiency anemia, unspecified iron deficiency anemia type    Macrocytic anemia    Primary osteoarthritis of both knees    Other orders  -     acetaminophen (TYLENOL) 500 MG tablet; Take 1 tablet by mouth Every 4 (Four) Hours As Needed for Mild Pain .        No visits with results within 3 Week(s) from this visit.   Latest known visit with results is:   Lab on 03/29/2019   Component Date Value Ref Range Status   • Glucose 03/29/2019 117* 70 - 99 mg/dL Final   • BUN 03/29/2019 72* 7 - 23 mg/dL Final   • Creatinine 03/29/2019 4.50* 0.52 - 1.04 mg/dL Final   • Sodium 03/29/2019 144  137 - 145 mmol/L Final   • Potassium 03/29/2019 4.8  3.4 - 5.0 mmol/L Final   • Chloride 03/29/2019 114* 101 - 112 mmol/L Final   • CO2 03/29/2019 18.0* 22.0 - 30.0 mmol/L Final   • Calcium 03/29/2019 10.4* 8.4 - 10.2 mg/dL Final   • Total Protein 03/29/2019 7.7  6.3 - 8.6 g/dL Final   • Albumin 03/29/2019 4.40  3.50 - 5.00 g/dL Final   • ALT (SGPT) 03/29/2019 12  <=35 U/L Final   • AST (SGOT) 03/29/2019 21  14 - 36 U/L Final   • Alkaline Phosphatase 03/29/2019 128* 38 - 126 U/L Final   • Total Bilirubin 03/29/2019 0.4  0.2 - 1.3 mg/dL Final   • eGFR Non African Amer 03/29/2019 9* 39 - 90 mL/min/1.73 Final    <15 Indicative of kidney failure.   • eGFR   Amer 03/29/2019   39 - 90 mL/min/1.73 Final    <15 Indicative of kidney failure.   • Globulin 03/29/2019 3.3  2.3 - 3.5 gm/dL Final   • A/G Ratio 03/29/2019 1.3  1.1 - 1.8 g/dL Final   •  BUN/Creatinine Ratio 03/29/2019 16.0  7.0 - 25.0 Final   • Anion Gap 03/29/2019 12.0  5.0 - 15.0 mmol/L Final   • WBC 03/29/2019 6.52  3.40 - 10.80 10*3/mm3 Final   • RBC 03/29/2019 3.90  3.77 - 5.28 10*6/mm3 Final   • Hemoglobin 03/29/2019 10.4* 12.0 - 15.9 g/dL Final   • Hematocrit 03/29/2019 33.5* 34.0 - 46.6 % Final   • MCV 03/29/2019 85.9  79.0 - 97.0 fL Final   • MCH 03/29/2019 26.7  26.6 - 33.0 pg Final   • MCHC 03/29/2019 31.0* 31.5 - 35.7 g/dL Final   • RDW 03/29/2019 14.7  12.3 - 15.4 % Final   • RDW-SD 03/29/2019 45.1  37.0 - 54.0 fl Final   • MPV 03/29/2019 9.5  6.0 - 12.0 fL Final   • Platelets 03/29/2019 310  140 - 450 10*3/mm3 Final   • Neutrophil % 03/29/2019 62.5  42.7 - 76.0 % Final   • Lymphocyte % 03/29/2019 23.6  19.6 - 45.3 % Final   • Monocyte % 03/29/2019 9.4  5.0 - 12.0 % Final   • Eosinophil % 03/29/2019 4.0  0.3 - 6.2 % Final   • Basophil % 03/29/2019 0.5  0.0 - 1.5 % Final   • Neutrophils, Absolute 03/29/2019 4.08  1.40 - 7.00 10*3/mm3 Final   • Lymphocytes, Absolute 03/29/2019 1.54  0.70 - 3.10 10*3/mm3 Final   • Monocytes, Absolute 03/29/2019 0.61  0.10 - 0.90 10*3/mm3 Final   • Eosinophils, Absolute 03/29/2019 0.26  0.00 - 0.40 10*3/mm3 Final   • Basophils, Absolute 03/29/2019 0.03  0.00 - 0.20 10*3/mm3 Final   ]

## 2020-08-03 RX ORDER — QUETIAPINE FUMARATE 50 MG/1
TABLET, FILM COATED ORAL
Qty: 165 TABLET | Refills: 5 | Status: SHIPPED | OUTPATIENT
Start: 2020-08-03 | End: 2021-02-09

## 2020-09-16 DIAGNOSIS — Z23 NEED FOR INFLUENZA VACCINATION: Primary | ICD-10-CM

## 2020-09-29 RX ORDER — DILTIAZEM HYDROCHLORIDE 180 MG/1
180 CAPSULE, COATED, EXTENDED RELEASE ORAL DAILY
Qty: 30 CAPSULE | Refills: 5 | Status: SHIPPED | OUTPATIENT
Start: 2020-09-29 | End: 2021-03-29

## 2020-10-14 DIAGNOSIS — E55.9 VITAMIN D DEFICIENCY: ICD-10-CM

## 2020-10-14 DIAGNOSIS — E78.2 MIXED HYPERLIPIDEMIA: ICD-10-CM

## 2020-10-14 DIAGNOSIS — D50.9 IRON DEFICIENCY ANEMIA, UNSPECIFIED IRON DEFICIENCY ANEMIA TYPE: ICD-10-CM

## 2020-10-14 DIAGNOSIS — R73.01 IMPAIRED FASTING GLUCOSE: ICD-10-CM

## 2020-10-14 DIAGNOSIS — E53.8 VITAMIN B12 DEFICIENCY: Primary | ICD-10-CM

## 2020-10-14 DIAGNOSIS — I10 ESSENTIAL HYPERTENSION: ICD-10-CM

## 2020-10-19 ENCOUNTER — OFFICE VISIT (OUTPATIENT)
Dept: FAMILY MEDICINE CLINIC | Facility: CLINIC | Age: 85
End: 2020-10-19

## 2020-10-19 VITALS
DIASTOLIC BLOOD PRESSURE: 61 MMHG | HEART RATE: 65 BPM | SYSTOLIC BLOOD PRESSURE: 117 MMHG | WEIGHT: 183 LBS | HEIGHT: 67 IN | BODY MASS INDEX: 28.72 KG/M2

## 2020-10-19 DIAGNOSIS — E53.8 VITAMIN B12 DEFICIENCY: Chronic | ICD-10-CM

## 2020-10-19 DIAGNOSIS — D50.9 IRON DEFICIENCY ANEMIA, UNSPECIFIED IRON DEFICIENCY ANEMIA TYPE: Chronic | ICD-10-CM

## 2020-10-19 DIAGNOSIS — E55.9 VITAMIN D DEFICIENCY: Chronic | ICD-10-CM

## 2020-10-19 DIAGNOSIS — I73.9 PERIPHERAL VASCULAR DISEASE (HCC): Chronic | ICD-10-CM

## 2020-10-19 DIAGNOSIS — N18.32 STAGE 3B CHRONIC KIDNEY DISEASE (HCC): Chronic | ICD-10-CM

## 2020-10-19 DIAGNOSIS — F05 SUNDOWN SYNDROME: Chronic | ICD-10-CM

## 2020-10-19 DIAGNOSIS — E78.2 MIXED HYPERLIPIDEMIA: Primary | Chronic | ICD-10-CM

## 2020-10-19 DIAGNOSIS — F02.818 LATE ONSET ALZHEIMER'S DISEASE WITH BEHAVIORAL DISTURBANCE (HCC): Chronic | ICD-10-CM

## 2020-10-19 DIAGNOSIS — K21.9 GASTROESOPHAGEAL REFLUX DISEASE, UNSPECIFIED WHETHER ESOPHAGITIS PRESENT: Chronic | ICD-10-CM

## 2020-10-19 DIAGNOSIS — M81.0 OSTEOPOROSIS WITHOUT CURRENT PATHOLOGICAL FRACTURE, UNSPECIFIED OSTEOPOROSIS TYPE: Chronic | ICD-10-CM

## 2020-10-19 DIAGNOSIS — R73.01 IMPAIRED FASTING GLUCOSE: Chronic | ICD-10-CM

## 2020-10-19 DIAGNOSIS — G30.1 LATE ONSET ALZHEIMER'S DISEASE WITH BEHAVIORAL DISTURBANCE (HCC): Chronic | ICD-10-CM

## 2020-10-19 DIAGNOSIS — I10 ESSENTIAL HYPERTENSION: Chronic | ICD-10-CM

## 2020-10-19 PROCEDURE — G2025 DIS SITE TELE SVCS RHC/FQHC: HCPCS | Performed by: INTERNAL MEDICINE

## 2020-10-19 NOTE — PATIENT INSTRUCTIONS
Exercising to Lose Weight  Exercise is structured, repetitive physical activity to improve fitness and health. Getting regular exercise is important for everyone. It is especially important if you are overweight. Being overweight increases your risk of heart disease, stroke, diabetes, high blood pressure, and several types of cancer. Reducing your calorie intake and exercising can help you lose weight.  Exercise is usually categorized as moderate or vigorous intensity. To lose weight, most people need to do a certain amount of moderate-intensity or vigorous-intensity exercise each week.  Moderate-intensity exercise    Moderate-intensity exercise is any activity that gets you moving enough to burn at least three times more energy (calories) than if you were sitting.  Examples of moderate exercise include:  · Walking a mile in 15 minutes.  · Doing light yard work.  · Biking at an easy pace.  Most people should get at least 150 minutes (2 hours and 30 minutes) a week of moderate-intensity exercise to maintain their body weight.  Vigorous-intensity exercise  Vigorous-intensity exercise is any activity that gets you moving enough to burn at least six times more calories than if you were sitting. When you exercise at this intensity, you should be working hard enough that you are not able to carry on a conversation.  Examples of vigorous exercise include:  · Running.  · Playing a team sport, such as football, basketball, and soccer.  · Jumping rope.  Most people should get at least 75 minutes (1 hour and 15 minutes) a week of vigorous-intensity exercise to maintain their body weight.  How can exercise affect me?  When you exercise enough to burn more calories than you eat, you lose weight. Exercise also reduces body fat and builds muscle. The more muscle you have, the more calories you burn. Exercise also:  · Improves mood.  · Reduces stress and tension.  · Improves your overall fitness, flexibility, and  endurance.  · Increases bone strength.  The amount of exercise you need to lose weight depends on:  · Your age.  · The type of exercise.  · Any health conditions you have.  · Your overall physical ability.  Talk to your health care provider about how much exercise you need and what types of activities are safe for you.  What actions can I take to lose weight?  Nutrition    · Make changes to your diet as told by your health care provider or diet and nutrition specialist (dietitian). This may include:  ? Eating fewer calories.  ? Eating more protein.  ? Eating less unhealthy fats.  ? Eating a diet that includes fresh fruits and vegetables, whole grains, low-fat dairy products, and lean protein.  ? Avoiding foods with added fat, salt, and sugar.  · Drink plenty of water while you exercise to prevent dehydration or heat stroke.  Activity  · Choose an activity that you enjoy and set realistic goals. Your health care provider can help you make an exercise plan that works for you.  · Exercise at a moderate or vigorous intensity most days of the week.  ? The intensity of exercise may vary from person to person. You can tell how intense a workout is for you by paying attention to your breathing and heartbeat. Most people will notice their breathing and heartbeat get faster with more intense exercise.  · Do resistance training twice each week, such as:  ? Push-ups.  ? Sit-ups.  ? Lifting weights.  ? Using resistance bands.  · Getting short amounts of exercise can be just as helpful as long structured periods of exercise. If you have trouble finding time to exercise, try to include exercise in your daily routine.  ? Get up, stretch, and walk around every 30 minutes throughout the day.  ? Go for a walk during your lunch break.  ? Park your car farther away from your destination.  ? If you take public transportation, get off one stop early and walk the rest of the way.  ? Make phone calls while standing up and walking  around.  ? Take the stairs instead of elevators or escalators.  · Wear comfortable clothes and shoes with good support.  · Do not exercise so much that you hurt yourself, feel dizzy, or get very short of breath.  Where to find more information  · [place] Department of Health and Human Services: www.hhs.gov  · Centers for Disease Control and Prevention (CDC): www.cdc.gov  Contact a health care provider:  · Before starting a new exercise program.  · If you have questions or concerns about your weight.  · If you have a medical problem that keeps you from exercising.  Get help right away if you have any of the following while exercising:  · Injury.  · Dizziness.  · Difficulty breathing or shortness of breath that does not go away when you stop exercising.  · Chest pain.  · Rapid heartbeat.  Summary  · Being overweight increases your risk of heart disease, stroke, diabetes, high blood pressure, and several types of cancer.  · Losing weight happens when you burn more calories than you eat.  · Reducing the amount of calories you eat in addition to getting regular moderate or vigorous exercise each week helps you lose weight.  This information is not intended to replace advice given to you by your health care provider. Make sure you discuss any questions you have with your health care provider.  Document Released: 01/20/2012 Document Revised: 12/31/2018 Document Reviewed: 12/31/2018  [employer] Patient Education © 2020 [employer]      Calorie Counting for Weight Loss  Calories are units of energy. Your body needs a certain amount of calories from food to keep you going throughout the day. When you eat more calories than your body needs, your body stores the extra calories as fat. When you eat fewer calories than your body needs, your body burns fat to get the energy it needs.  Calorie counting means keeping track of how many calories you eat and drink each day. Calorie counting can be helpful if you need to lose weight. If  you make sure to eat fewer calories than your body needs, you should lose weight. Ask your health care provider what a healthy weight is for you.  For calorie counting to work, you will need to eat the right number of calories in a day in order to lose a healthy amount of weight per week. A dietitian can help you determine how many calories you need in a day and will give you suggestions on how to reach your calorie goal.  · A healthy amount of weight to lose per week is usually 1-2 lb (0.5-0.9 kg). This usually means that your daily calorie intake should be reduced by 500-750 calories.  · Eating 1,200 - 1,500 calories per day can help most women lose weight.  · Eating 1,500 - 1,800 calories per day can help most men lose weight.  What is my plan?  My goal is to have __________ calories per day.  If I have this many calories per day, I should lose around __________ pounds per week.  What do I need to know about calorie counting?  In order to meet your daily calorie goal, you will need to:  · Find out how many calories are in each food you would like to eat. Try to do this before you eat.  · Decide how much of the food you plan to eat.  · Write down what you ate and how many calories it had. Doing this is called keeping a food log.  To successfully lose weight, it is important to balance calorie counting with a healthy lifestyle that includes regular activity. Aim for 150 minutes of moderate exercise (such as walking) or 75 minutes of vigorous exercise (such as running) each week.  Where do I find calorie information?    The number of calories in a food can be found on a Nutrition Facts label. If a food does not have a Nutrition Facts label, try to look up the calories online or ask your dietitian for help.  Remember that calories are listed per serving. If you choose to have more than one serving of a food, you will have to multiply the calories per serving by the amount of servings you plan to eat. For example, the  "label on a package of bread might say that a serving size is 1 slice and that there are 90 calories in a serving. If you eat 1 slice, you will have eaten 90 calories. If you eat 2 slices, you will have eaten 180 calories.  How do I keep a food log?  Immediately after each meal, record the following information in your food log:  · What you ate. Don't forget to include toppings, sauces, and other extras on the food.  · How much you ate. This can be measured in cups, ounces, or number of items.  · How many calories each food and drink had.  · The total number of calories in the meal.  Keep your food log near you, such as in a small notebook in your pocket, or use a mobile madan or website. Some programs will calculate calories for you and show you how many calories you have left for the day to meet your goal.  What are some calorie counting tips?    · Use your calories on foods and drinks that will fill you up and not leave you hungry:  ? Some examples of foods that fill you up are nuts and nut butters, vegetables, lean proteins, and high-fiber foods like whole grains. High-fiber foods are foods with more than 5 g fiber per serving.  ? Drinks such as sodas, specialty coffee drinks, alcohol, and juices have a lot of calories, yet do not fill you up.  · Eat nutritious foods and avoid empty calories. Empty calories are calories you get from foods or beverages that do not have many vitamins or protein, such as candy, sweets, and soda. It is better to have a nutritious high-calorie food (such as an avocado) than a food with few nutrients (such as a bag of chips).  · Know how many calories are in the foods you eat most often. This will help you calculate calorie counts faster.  · Pay attention to calories in drinks. Low-calorie drinks include water and unsweetened drinks.  · Pay attention to nutrition labels for \"low fat\" or \"fat free\" foods. These foods sometimes have the same amount of calories or more calories than the " full fat versions. They also often have added sugar, starch, or salt, to make up for flavor that was removed with the fat.  · Find a way of tracking calories that works for you. Get creative. Try different apps or programs if writing down calories does not work for you.  What are some portion control tips?  · Know how many calories are in a serving. This will help you know how many servings of a certain food you can have.  · Use a measuring cup to measure serving sizes. You could also try weighing out portions on a kitchen scale. With time, you will be able to estimate serving sizes for some foods.  · Take some time to put servings of different foods on your favorite plates, bowls, and cups so you know what a serving looks like.  · Try not to eat straight from a bag or box. Doing this can lead to overeating. Put the amount you would like to eat in a cup or on a plate to make sure you are eating the right portion.  · Use smaller plates, glasses, and bowls to prevent overeating.  · Try not to multitask (for example, watch TV or use your computer) while eating. If it is time to eat, sit down at a table and enjoy your food. This will help you to know when you are full. It will also help you to be aware of what you are eating and how much you are eating.  What are tips for following this plan?  Reading food labels  · Check the calorie count compared to the serving size. The serving size may be smaller than what you are used to eating.  · Check the source of the calories. Make sure the food you are eating is high in vitamins and protein and low in saturated and trans fats.  Shopping  · Read nutrition labels while you shop. This will help you make healthy decisions before you decide to purchase your food.  · Make a grocery list and stick to it.  Cooking  · Try to cook your favorite foods in a healthier way. For example, try baking instead of frying.  · Use low-fat dairy products.  Meal planning  · Use more fruits and  "vegetables. Half of your plate should be fruits and vegetables.  · Include lean proteins like poultry and fish.  How do I count calories when eating out?  · Ask for smaller portion sizes.  · Consider sharing an entree and sides instead of getting your own entree.  · If you get your own entree, eat only half. Ask for a box at the beginning of your meal and put the rest of your entree in it so you are not tempted to eat it.  · If calories are listed on the menu, choose the lower calorie options.  · Choose dishes that include vegetables, fruits, whole grains, low-fat dairy products, and lean protein.  · Choose items that are boiled, broiled, grilled, or steamed. Stay away from items that are buttered, battered, fried, or served with cream sauce. Items labeled \"crispy\" are usually fried, unless stated otherwise.  · Choose water, low-fat milk, unsweetened iced tea, or other drinks without added sugar. If you want an alcoholic beverage, choose a lower calorie option such as a glass of wine or light beer.  · Ask for dressings, sauces, and syrups on the side. These are usually high in calories, so you should limit the amount you eat.  · If you want a salad, choose a garden salad and ask for grilled meats. Avoid extra toppings like neff, cheese, or fried items. Ask for the dressing on the side, or ask for olive oil and vinegar or lemon to use as dressing.  · Estimate how many servings of a food you are given. For example, a serving of cooked rice is ½ cup or about the size of half a baseball. Knowing serving sizes will help you be aware of how much food you are eating at restaurants. The list below tells you how big or small some common portion sizes are based on everyday objects:  ? 1 oz--4 stacked dice.  ? 3 oz--1 deck of cards.  ? 1 tsp--1 die.  ? 1 Tbsp--½ a ping-pong ball.  ? 2 Tbsp--1 ping-pong ball.  ? ½ cup--½ baseball.  ? 1 cup--1 baseball.  Summary  · Calorie counting means keeping track of how many calories you " eat and drink each day. If you eat fewer calories than your body needs, you should lose weight.  · A healthy amount of weight to lose per week is usually 1-2 lb (0.5-0.9 kg). This usually means reducing your daily calorie intake by 500-750 calories.  · The number of calories in a food can be found on a Nutrition Facts label. If a food does not have a Nutrition Facts label, try to look up the calories online or ask your dietitian for help.  · Use your calories on foods and drinks that will fill you up, and not on foods and drinks that will leave you hungry.  · Use smaller plates, glasses, and bowls to prevent overeating.  This information is not intended to replace advice given to you by your health care provider. Make sure you discuss any questions you have with your health care provider.  Document Released: 12/18/2006 Document Revised: 09/06/2019 Document Reviewed: 11/17/2017  [employer] Patient Education © 2020 [employer]

## 2020-10-19 NOTE — PROGRESS NOTES
Subjective      You have chosen to receive care through a telephone visit. Do you consent to use a telephone visit for your medical care today? Yes    Total visit time: 23 minutes.        History of Present Illness     Camilla Izquierdo is a 89 y.o. female who receives care via telephone visit with the assistance of her daughter for 4-month follow up on with multiple medical issues including hyperlipidemia, peripheral vascular disease, carotid artery disease, impaired fasting glucose, osteoporosis, Alzheimer's disease with sundowning, and multiple other issues.  She continues on Aricept for dementia, Lexapro for anxiety, and Seroquel for sundowning symptoms and agitation.  Patient's daughter states she is noticing a decline.  She occasionally spits out medicines and is more confused.  She is no longer calling family members by name.  It remains challenging to get her to eat with the exception of breakfast food.  However, her body weight remains stable with BMI 28.7.  Protein levels remain good.  She is no longer walking without assistance and refuses to use her walker.  The family has a gait belt to use if needed.            Labs reveal vitamin D deficiency with vitamin D 18.9 despite taking vitamin D and calcium supplements.  We will have her increase by 50% and continue the calcium supplement.      Vitamin B-12 at goal with monthly B-12 injections given by Home Health.  [facility] continues to draw labs.      The patient's relevant past medical, surgical, and social history was reviewed in Epic.   Lab results are reviewed with the patient today. CBC reveals stable hemoglobin 10.8.  Total cholesterol 217.  Triglycerides 191.  HDL 51.  .  Iron level acceptable.  Vitamin D deficiency as noted above.  Vitamin B-12 at goal.      Review of Systems   Constitutional: Negative for chills, fatigue and fever.   HENT: Negative for congestion, ear pain, postnasal drip, sinus pressure and sore throat.    Respiratory:  "Negative for cough, shortness of breath and wheezing.    Cardiovascular: Negative for chest pain, palpitations and leg swelling.   Gastrointestinal: Negative for abdominal pain, blood in stool, constipation, diarrhea, nausea and vomiting.   Endocrine: Negative for cold intolerance, heat intolerance, polydipsia and polyuria.   Genitourinary: Negative for dysuria, frequency, hematuria and urgency.   Skin: Negative for rash.   Neurological: Negative for syncope and weakness.        Objective     Visit Vitals  /61   Pulse 65   Ht 170.2 cm (67\")   Wt 83 kg (183 lb)   BMI 28.66 kg/m²     Physical Exam        Assessment/Plan      Increase the oral vitamin D 1000 mcg to 2000 mcg daily, doubling the current dose.  We will continue to monitor.  Continue the calcium/vitamin D supplement.     Continue Aricept for Alzheimer's dementia, Lexapro for anxiety/depression, and Seroquel to help with sundowning symptoms.  Patient's daughter is noticing gradual cognitive decline.  Family continues to take excellent care of Mrs. Izquierdo.      Continue Prilosec for GERD, which is stable.     Continue Lipitor.  Continue the current peripheral vascular disease risk factor modifications.    Continue to avoid NSAIDs and other nephrotoxic drugs.  Renal function is stable.    Continue the vitamin B12 injections approximately every 2 months.    Include a hemoglobin A1c with next set of labs in this patient with impaired fasting glucose, which is stable.    Discussed risks and precautions to help decrease fall risk.  Recommended they keep the gait belt close by to use as needed.  Patient refuses to use a walker, but is no longer ambulating without assistance.     We will repeat telephone visit in four months to follow up on chronic medical issues.  Home Health continues to draw labs.        Scribed for Dr. Barton by Yulia Barros [facility].     Diagnoses and all orders for this visit:    Mixed hyperlipidemia  -     CBC Auto Differential; " Future  -     Comprehensive Metabolic Panel; Future  -     LDL Cholesterol, Direct; Future    Essential hypertension  -     Comprehensive Metabolic Panel; Future    Peripheral vascular disease (CMS/HCC)  -     LDL Cholesterol, Direct; Future    Gastroesophageal reflux disease, unspecified whether esophagitis present    Vitamin B12 deficiency  -     Vitamin B12; Future    Vitamin D deficiency  -     Vitamin D 25 Hydroxy; Future    Impaired fasting glucose  -     Hemoglobin A1c; Future    Late onset Alzheimer's disease with behavioral disturbance (CMS/HCC)    Osteoporosis without current pathological fracture, unspecified osteoporosis type  -     Vitamin D 25 Hydroxy; Future    Stage 3b chronic kidney disease  -     Comprehensive Metabolic Panel; Future    Iron deficiency anemia, unspecified iron deficiency anemia type  -     CBC Auto Differential; Future  -     Ferritin; Future  -     Iron Profile; Future    SunDown syndrome        No visits with results within 3 Week(s) from this visit.   Latest known visit with results is:   Lab on 03/29/2019   Component Date Value Ref Range Status   • Glucose 03/29/2019 117* 70 - 99 mg/dL Final   • BUN 03/29/2019 72* 7 - 23 mg/dL Final   • Creatinine 03/29/2019 4.50* 0.52 - 1.04 mg/dL Final   • Sodium 03/29/2019 144  137 - 145 mmol/L Final   • Potassium 03/29/2019 4.8  3.4 - 5.0 mmol/L Final   • Chloride 03/29/2019 114* 101 - 112 mmol/L Final   • CO2 03/29/2019 18.0* 22.0 - 30.0 mmol/L Final   • Calcium 03/29/2019 10.4* 8.4 - 10.2 mg/dL Final   • Total Protein 03/29/2019 7.7  6.3 - 8.6 g/dL Final   • Albumin 03/29/2019 4.40  3.50 - 5.00 g/dL Final   • ALT (SGPT) 03/29/2019 12  <=35 U/L Final   • AST (SGOT) 03/29/2019 21  14 - 36 U/L Final   • Alkaline Phosphatase 03/29/2019 128* 38 - 126 U/L Final   • Total Bilirubin 03/29/2019 0.4  0.2 - 1.3 mg/dL Final   • eGFR Non African Amer 03/29/2019 9* 39 - 90 mL/min/1.73 Final    <15 Indicative of kidney failure.   • eGFR   Amer  03/29/2019   39 - 90 mL/min/1.73 Final    <15 Indicative of kidney failure.   • Globulin 03/29/2019 3.3  2.3 - 3.5 gm/dL Final   • A/G Ratio 03/29/2019 1.3  1.1 - 1.8 g/dL Final   • BUN/Creatinine Ratio 03/29/2019 16.0  7.0 - 25.0 Final   • Anion Gap 03/29/2019 12.0  5.0 - 15.0 mmol/L Final   • WBC 03/29/2019 6.52  3.40 - 10.80 10*3/mm3 Final   • RBC 03/29/2019 3.90  3.77 - 5.28 10*6/mm3 Final   • Hemoglobin 03/29/2019 10.4* 12.0 - 15.9 g/dL Final   • Hematocrit 03/29/2019 33.5* 34.0 - 46.6 % Final   • MCV 03/29/2019 85.9  79.0 - 97.0 fL Final   • MCH 03/29/2019 26.7  26.6 - 33.0 pg Final   • MCHC 03/29/2019 31.0* 31.5 - 35.7 g/dL Final   • RDW 03/29/2019 14.7  12.3 - 15.4 % Final   • RDW-SD 03/29/2019 45.1  37.0 - 54.0 fl Final   • MPV 03/29/2019 9.5  6.0 - 12.0 fL Final   • Platelets 03/29/2019 310  140 - 450 10*3/mm3 Final   • Neutrophil % 03/29/2019 62.5  42.7 - 76.0 % Final   • Lymphocyte % 03/29/2019 23.6  19.6 - 45.3 % Final   • Monocyte % 03/29/2019 9.4  5.0 - 12.0 % Final   • Eosinophil % 03/29/2019 4.0  0.3 - 6.2 % Final   • Basophil % 03/29/2019 0.5  0.0 - 1.5 % Final   • Neutrophils, Absolute 03/29/2019 4.08  1.40 - 7.00 10*3/mm3 Final   • Lymphocytes, Absolute 03/29/2019 1.54  0.70 - 3.10 10*3/mm3 Final   • Monocytes, Absolute 03/29/2019 0.61  0.10 - 0.90 10*3/mm3 Final   • Eosinophils, Absolute 03/29/2019 0.26  0.00 - 0.40 10*3/mm3 Final   • Basophils, Absolute 03/29/2019 0.03  0.00 - 0.20 10*3/mm3 Final   ]

## 2020-10-29 RX ORDER — ATORVASTATIN CALCIUM 20 MG/1
TABLET, FILM COATED ORAL
Qty: 15 TABLET | Refills: 5 | Status: SHIPPED | OUTPATIENT
Start: 2020-10-29 | End: 2021-04-27

## 2020-11-11 RX ORDER — OMEPRAZOLE 40 MG/1
40 CAPSULE, DELAYED RELEASE ORAL DAILY
Qty: 30 CAPSULE | Refills: 11 | Status: SHIPPED | OUTPATIENT
Start: 2020-11-11

## 2020-11-30 RX ORDER — DONEPEZIL HYDROCHLORIDE 5 MG/1
5 TABLET, FILM COATED ORAL NIGHTLY
Qty: 30 TABLET | Refills: 5 | Status: SHIPPED | OUTPATIENT
Start: 2020-11-30 | End: 2021-04-27

## 2021-01-27 RX ORDER — CYANOCOBALAMIN 1000 UG/ML
INJECTION, SOLUTION INTRAMUSCULAR; SUBCUTANEOUS
Qty: 1 ML | Refills: 11 | Status: SHIPPED | OUTPATIENT
Start: 2021-01-27

## 2021-02-09 RX ORDER — QUETIAPINE FUMARATE 50 MG/1
TABLET, FILM COATED ORAL
Qty: 165 TABLET | Refills: 5 | Status: SHIPPED | OUTPATIENT
Start: 2021-02-09

## 2021-02-24 DIAGNOSIS — E53.8 VITAMIN B12 DEFICIENCY: ICD-10-CM

## 2021-02-24 DIAGNOSIS — D50.9 IRON DEFICIENCY ANEMIA, UNSPECIFIED IRON DEFICIENCY ANEMIA TYPE: ICD-10-CM

## 2021-02-24 DIAGNOSIS — E55.9 VITAMIN D DEFICIENCY: ICD-10-CM

## 2021-02-24 DIAGNOSIS — R73.01 IMPAIRED FASTING GLUCOSE: ICD-10-CM

## 2021-02-24 DIAGNOSIS — E78.2 MIXED HYPERLIPIDEMIA: Primary | ICD-10-CM

## 2021-02-24 DIAGNOSIS — I10 ESSENTIAL HYPERTENSION: ICD-10-CM

## 2021-02-26 DIAGNOSIS — I10 ESSENTIAL HYPERTENSION: ICD-10-CM

## 2021-02-26 DIAGNOSIS — D50.9 IRON DEFICIENCY ANEMIA, UNSPECIFIED IRON DEFICIENCY ANEMIA TYPE: ICD-10-CM

## 2021-02-26 DIAGNOSIS — E78.2 MIXED HYPERLIPIDEMIA: ICD-10-CM

## 2021-02-26 DIAGNOSIS — R73.01 IMPAIRED FASTING GLUCOSE: ICD-10-CM

## 2021-03-01 ENCOUNTER — OFFICE VISIT (OUTPATIENT)
Dept: FAMILY MEDICINE CLINIC | Facility: CLINIC | Age: 86
End: 2021-03-01

## 2021-03-01 VITALS
WEIGHT: 183 LBS | BODY MASS INDEX: 28.72 KG/M2 | DIASTOLIC BLOOD PRESSURE: 60 MMHG | SYSTOLIC BLOOD PRESSURE: 130 MMHG | HEIGHT: 67 IN

## 2021-03-01 DIAGNOSIS — E53.8 VITAMIN B12 DEFICIENCY: Chronic | ICD-10-CM

## 2021-03-01 DIAGNOSIS — K59.04 CHRONIC IDIOPATHIC CONSTIPATION: Chronic | ICD-10-CM

## 2021-03-01 DIAGNOSIS — F05 SUNDOWN SYNDROME: Chronic | ICD-10-CM

## 2021-03-01 DIAGNOSIS — N18.32 STAGE 3B CHRONIC KIDNEY DISEASE (HCC): Chronic | ICD-10-CM

## 2021-03-01 DIAGNOSIS — I10 ESSENTIAL HYPERTENSION: Chronic | ICD-10-CM

## 2021-03-01 DIAGNOSIS — E55.9 VITAMIN D DEFICIENCY: Chronic | ICD-10-CM

## 2021-03-01 DIAGNOSIS — F02.818 LATE ONSET ALZHEIMER'S DISEASE WITH BEHAVIORAL DISTURBANCE (HCC): Chronic | ICD-10-CM

## 2021-03-01 DIAGNOSIS — G30.1 LATE ONSET ALZHEIMER'S DISEASE WITH BEHAVIORAL DISTURBANCE (HCC): Chronic | ICD-10-CM

## 2021-03-01 DIAGNOSIS — I65.29 STENOSIS OF CAROTID ARTERY, UNSPECIFIED LATERALITY: Chronic | ICD-10-CM

## 2021-03-01 DIAGNOSIS — G47.9 SLEEP DISORDER: Chronic | ICD-10-CM

## 2021-03-01 DIAGNOSIS — I73.9 PERIPHERAL VASCULAR DISEASE (HCC): Chronic | ICD-10-CM

## 2021-03-01 DIAGNOSIS — D50.8 IRON DEFICIENCY ANEMIA SECONDARY TO INADEQUATE DIETARY IRON INTAKE: Chronic | ICD-10-CM

## 2021-03-01 DIAGNOSIS — E78.2 MIXED HYPERLIPIDEMIA: Primary | Chronic | ICD-10-CM

## 2021-03-01 PROCEDURE — G2025 DIS SITE TELE SVCS RHC/FQHC: HCPCS | Performed by: INTERNAL MEDICINE

## 2021-03-01 RX ORDER — FERROUS SULFATE 325(65) MG
325 TABLET ORAL 3 TIMES WEEKLY
Qty: 15 TABLET | Refills: 5 | Status: SHIPPED | OUTPATIENT
Start: 2021-03-01

## 2021-03-01 RX ORDER — DOCUSATE SODIUM 100 MG/1
100 CAPSULE, LIQUID FILLED ORAL 2 TIMES DAILY
COMMUNITY

## 2021-03-01 NOTE — PROGRESS NOTES
You have chosen to receive care through a telephone visit. Do you consent to use a telephone visit for your medical care today? Yes    Total visit time: 33 minutes.     Chief Complaint  Follow-up (4 months . ) and Altered Mental Status (states seems to be increasing. They are having trouble getting her meds down )    Subjective          History of Present Illness     Camilla Izquierdo who receives care via telephone visit with her son-in-law, Ori, relaying information for 4-month follow up on with multiple medical issues including hyperlipidemia, peripheral vascular disease, carotid artery disease, impaired fasting glucose, osteoporosis, as well as worsening symptoms of Alzheimer's disease with sundowning.  She is no longer physically able to come in for carotid Dopplers or other evaluation of PVD, but we continue statin therapy empirically.    She continues on Aricept for dementia, Lexapro for anxiety, and Seroquel 1/2 in the morning, 1 before sundown,and 3-4 at bedtime for sundowning symptoms and agitation, although, her family is having increasing difficulty getting her medications down due to worsening agitation, especially in the evening.  She sometimes puts the medications in her mouth, but refuses to swallow.  She has an increased tendency to wake early and wander through the house, but then tends to sleep a majority of the day.  She stepped over a bariccade yesterday morning and went into the kitchen where she fell, although, no serious injury.  She thoroughly enjoys breakfast and breakfast food, but has become increasingly selective in eating even with foods she enjoyed in the past.  She is still hydrating adequately.    She has not had COVID vaccine.  Home Health continues to follow patient and is not giving the vaccines currently.  Family states it would be challenging to get patient to a site where vaccines are given and would prefer to wait to see if vaccine can be given by Home Health in the future.         "    Last fall, we asked family to increase vitamin D by 50% from 1000 mcg to 2000 mcg daily and continue the calcium supplement. Vitamin D improved at 29.9, up from 18.9.    Her bowels are moving every 2 to 3 days with stool softeners.  They are not having to give her laxatives.    The patient's relevant past medical, surgical, and social history was reviewed in Epic.   Lab results are reviewed with the patient today. [facility] continues to draw labs.  CBC reveals hemoglobin slightly better as well as renal function.  Cholesterol acceptable with Lipitor.   Vitamin B-12 at goal with monthly B-12 injections given by Home Health.   Iron levels adequate, although, iron saturation is dropping and patient is no longer taking oral iron supplementation.         Objective   Vital Signs:   /60   Ht 170.2 cm (67\")   Wt 83 kg (183 lb)   BMI 28.66 kg/m²       Physical Exam   Result Review :           Lipid Panel    Lipid Panel 10/15/20 2/25/21   Total Cholesterol 217 (A)    Triglycerides 191 (A)    HDL Cholesterol 51    LDL Cholesterol  119 (A) 125 (A)   (A) Abnormal value                A1C Last 3 Results    HGBA1C Last 3 Results 10/15/20 2/25/21   Hemoglobin A1C 7.4 (A) 7.5 (A)   (A) Abnormal value       Comments are available for some flowsheets but are not being displayed.           Data reviewed: Home health records          Assessment and Plan    Diagnoses and all orders for this visit:    1. Mixed hyperlipidemia (Primary)  -     CBC Auto Differential; Future  -     Comprehensive Metabolic Panel; Future  -     Lipid Panel; Future    2. Essential hypertension  -     Comprehensive Metabolic Panel; Future    3. Peripheral vascular disease (CMS/HCC)  -     Lipid Panel; Future    4. Vitamin D deficiency  -     Vitamin D 25 Hydroxy; Future    5. Vitamin B12 deficiency  -     CBC Auto Differential; Future  -     Vitamin B12; Future    6. Chronic idiopathic constipation  -     TSH; Future    7. Stage 3b chronic " kidney disease (CMS/HCC)  -     Comprehensive Metabolic Panel; Future    8. Iron deficiency anemia secondary to inadequate dietary iron intake  -     CBC Auto Differential; Future  -     Ferritin; Future  -     Iron Profile; Future    9. Late onset Alzheimer's disease with behavioral disturbance (CMS/HCC)    10. SunDown syndrome    11. Sleep disorder    12. Stenosis of carotid artery, unspecified laterality  -     Lipid Panel; Future    Other orders  -     ferrous sulfate 325 (65 FE) MG tablet; Take 1 tablet by mouth 3 (Three) Times a Week. MOnday, Wed. And Friday  Dispense: 15 tablet; Refill: 5           Recommended family/sitters try crushing and mixing evening medications with pudding, applesauce, or even ice cream, which she enjoys.  There are also flavoring additves available at the pharmacy to help improve taste and prevent the bitter taste sensation of medications.  They may find adding an additional dose of Seroquel at sundown is helpful in the worsening sundowning symptonms. Continue Aricept for Alzheimer's dementia and Lexapro for anxiety/depression.  If no improvement in a few weeks with this approach, we can explore other options including 2-drug treatment in the evening.        With her iron saturation trending down, I recommended we restart oral iron three days weekly.  I sent a prescription for ferrous sulfate 325 mg three times weekly.  Continue the Colace stool softeners 100 mg b.i.d. to help prevent constipation.  Notify me if constipation worsens, and we would recommend adding MiraLAX    I recommended they make sure she is not developing squamous debris on the tongue, which could be obscuring taste sensation and may find offering breakfast foods, which she enjoys, later in the day may be better received than other foods she now refuses.        Continue the current carotid/PVD risk factor modifications including statin therapy    Continue oral vitamin D  2000 mcg daily.  Vitamin D improved.        Continue Prilosec for GERD, which is stable.     We will repeat telephone visit in four months to follow up on chronic medical issues.  Home Health continues to draw labs.        Scribed for Dr. Barton by [facility].        Follow Up   Return in about 4 months (around 7/1/2021).  Patient was given instructions and counseling regarding her condition or for health maintenance advice. Please see specific information pulled into the AVS if appropriate.

## 2021-03-03 ENCOUNTER — TELEPHONE (OUTPATIENT)
Dept: FAMILY MEDICINE CLINIC | Facility: CLINIC | Age: 86
End: 2021-03-03

## 2021-03-03 DIAGNOSIS — G30.1 LATE ONSET ALZHEIMER'S DISEASE WITH BEHAVIORAL DISTURBANCE (HCC): Primary | ICD-10-CM

## 2021-03-03 DIAGNOSIS — F02.818 LATE ONSET ALZHEIMER'S DISEASE WITH BEHAVIORAL DISTURBANCE (HCC): Primary | ICD-10-CM

## 2021-03-03 RX ORDER — ALPRAZOLAM 0.25 MG/1
0.25 TABLET ORAL 2 TIMES DAILY PRN
Qty: 60 TABLET | Refills: 2 | Status: SHIPPED | OUTPATIENT
Start: 2021-03-03

## 2021-03-29 RX ORDER — DILTIAZEM HYDROCHLORIDE 180 MG/1
180 CAPSULE, COATED, EXTENDED RELEASE ORAL DAILY
Qty: 30 CAPSULE | Refills: 5 | Status: SHIPPED | OUTPATIENT
Start: 2021-03-29

## 2021-04-19 DIAGNOSIS — R30.0 DYSURIA: Primary | ICD-10-CM

## 2021-04-20 RX ORDER — CEFUROXIME AXETIL 250 MG/1
250 TABLET ORAL 2 TIMES DAILY
Qty: 14 TABLET | Refills: 0 | Status: SHIPPED | OUTPATIENT
Start: 2021-04-20 | End: 2021-04-27

## 2021-04-20 NOTE — TELEPHONE ENCOUNTER
Patient has U/A yesterday and has UTI. Dr. Barton has ordered Ceftin. I notified the daughter and [facility] YAMIL Duque.  TP

## 2021-04-27 RX ORDER — DONEPEZIL HYDROCHLORIDE 5 MG/1
5 TABLET, FILM COATED ORAL NIGHTLY
Qty: 30 TABLET | Refills: 5 | Status: SHIPPED | OUTPATIENT
Start: 2021-04-27

## 2021-04-27 RX ORDER — ATORVASTATIN CALCIUM 20 MG/1
TABLET, FILM COATED ORAL
Qty: 15 TABLET | Refills: 5 | Status: SHIPPED | OUTPATIENT
Start: 2021-04-27

## 2021-04-27 RX ORDER — ESCITALOPRAM OXALATE 10 MG/1
TABLET ORAL
Qty: 30 TABLET | Refills: 5 | Status: SHIPPED | OUTPATIENT
Start: 2021-04-27